# Patient Record
Sex: FEMALE | Race: BLACK OR AFRICAN AMERICAN | NOT HISPANIC OR LATINO | Employment: OTHER | URBAN - METROPOLITAN AREA
[De-identification: names, ages, dates, MRNs, and addresses within clinical notes are randomized per-mention and may not be internally consistent; named-entity substitution may affect disease eponyms.]

---

## 2017-01-18 ENCOUNTER — OFFICE VISIT (OUTPATIENT)
Dept: PAIN MEDICINE | Facility: CLINIC | Age: 77
End: 2017-01-18
Attending: ANESTHESIOLOGY
Payer: MEDICARE

## 2017-01-18 VITALS
BODY MASS INDEX: 33.51 KG/M2 | RESPIRATION RATE: 18 BRPM | HEART RATE: 76 BPM | SYSTOLIC BLOOD PRESSURE: 189 MMHG | TEMPERATURE: 99 F | WEIGHT: 177.5 LBS | DIASTOLIC BLOOD PRESSURE: 75 MMHG | HEIGHT: 61 IN

## 2017-01-18 DIAGNOSIS — M54.16 SPINAL STENOSIS OF LUMBAR REGION WITH RADICULOPATHY: ICD-10-CM

## 2017-01-18 DIAGNOSIS — M48.061 SPINAL STENOSIS OF LUMBAR REGION WITH RADICULOPATHY: ICD-10-CM

## 2017-01-18 DIAGNOSIS — M51.36 DDD (DEGENERATIVE DISC DISEASE), LUMBAR: ICD-10-CM

## 2017-01-18 PROCEDURE — 99214 OFFICE O/P EST MOD 30 MIN: CPT | Mod: S$PBB,,, | Performed by: ANESTHESIOLOGY

## 2017-01-18 PROCEDURE — 99999 PR PBB SHADOW E&M-EST. PATIENT-LVL III: CPT | Mod: PBBFAC,,, | Performed by: ANESTHESIOLOGY

## 2017-01-18 PROCEDURE — 99213 OFFICE O/P EST LOW 20 MIN: CPT | Mod: PBBFAC | Performed by: ANESTHESIOLOGY

## 2017-01-18 RX ORDER — GABAPENTIN 300 MG/1
300 CAPSULE ORAL 3 TIMES DAILY
Qty: 90 CAPSULE | Refills: 5 | Status: SHIPPED | OUTPATIENT
Start: 2017-01-18 | End: 2017-07-17

## 2017-01-18 RX ORDER — MECLIZINE HYDROCHLORIDE 25 MG/1
TABLET ORAL
COMMUNITY
Start: 2016-12-28 | End: 2017-05-31 | Stop reason: SDUPTHER

## 2017-01-18 NOTE — MR AVS SNAPSHOT
[facility]  [address]  [facility] [address]  Phone: [phone]  Fax: [phone]                  Laura Curry   2017 8:00 AM   Office Visit    Description:  Female : 1940   Provider:  Aparna Welch MD   Department:  [facility]           Reason for Visit     Leg Pain           Diagnoses this Visit        Comments    Spinal stenosis of lumbar region with radiculopathy         DDD (degenerative disc disease), lumbar                To Do List           Future Appointments        Provider Department Dept Phone    2/15/2017 8:00 AM Aparna Welch MD [facility] [phone]      Goals (5 Years of Data)     None       These Medications        Disp Refills Start End    gabapentin (NEURONTIN) 300 MG capsule 90 capsule 5 2017    Take 1 capsule (300 mg total) by mouth 3 (three) times daily. - Oral    Pharmacy: [address] #: [phone]         [facility]     [facility] On Call Nurse Care Line -  Assistance  Registered nurses in the [facility] Call Center provide clinical advisement, health education, appointment booking, and other advisory services.  Call for this free service at [phone].             Medications           Message regarding Medications     Verify the changes and/or additions to your medication regime listed below are the same as discussed with your clinician today.  If any of these changes or additions are incorrect, please notify your healthcare provider.        CHANGE how you are taking these medications     Start Taking Instead of    gabapentin (NEURONTIN) 300 MG capsule gabapentin (NEURONTIN) 300 MG capsule    Dosage:  Take 1 capsule (300 mg total) by mouth 3 (three) times daily. Dosage:  Take 1 capsule (300 mg total) by mouth 2 (two) times daily.    Reason for Change:  Reorder            Verify that the below list of  "medications is an accurate representation of the medications you are currently taking.  If none reported, the list may be blank. If incorrect, please contact your healthcare provider. Carry this list with you in case of emergency.           Current Medications     benzonatate (TESSALON) 100 MG capsule     escitalopram oxalate (LEXAPRO) 10 MG tablet     gabapentin (NEURONTIN) 300 MG capsule Take 1 capsule (300 mg total) by mouth 3 (three) times daily.    glipiZIDE (GLUCOTROL) 10 MG tablet     hydrochlorothiazide (HYDRODIURIL) 25 MG tablet     hydrocodone-acetaminophen 5-325mg (NORCO) 5-325 mg per tablet Take 1 tablet by mouth every 6 (six) hours as needed for Pain.    lisinopril (PRINIVIL,ZESTRIL) 20 MG tablet     meclizine (ANTIVERT) 25 mg tablet     metformin (GLUCOPHAGE) 500 MG tablet Take 500 mg by mouth 2 (two) times daily with meals.    metoprolol succinate (TOPROL-XL) 25 MG 24 hr tablet Take 25 mg by mouth once daily.    timolol maleate 0.5% (TIMOPTIC) 0.5 % Drop INT 1 DROP  INTO EACH EYE BID    triamcinolone acetonide 0.1% (KENALOG) 0.1 % ointment            Clinical Reference Information           Vital Signs - Last Recorded  Most recent update: 1/18/2017  8:00 AM by Cinthia Oglesby MA    BP Pulse Temp Resp Ht Wt    (!) 189/75 76 98.5 °F (36.9 °C) (Oral) 18 5' 1" (1.549 m) 80.5 kg (177 lb 7.5 oz)    BMI                33.53 kg/m2          Blood Pressure          Most Recent Value    BP  (!)  189/75      Allergies as of 1/18/2017     Iodine      Immunizations Administered on Date of Encounter - 1/18/2017     None      [facility] Sign-Up     Activating your [facility] account is as easy as 1-2-3!     1) Visit [email or web address], select Sign Up Now, enter this activation code and your date of birth, then select Next.  [email or web address]  Expires: 1/28/2017  8:23 AM      2) Create a username and password to use when you visit [facility] in the future and select a security question in case you lose your password and " select Next.    3) Enter your e-mail address and click Sign Up!    Additional Information  If you have questions, please e-mail [email or web address] or call [phone] to talk to our [facility] staff. Remember, [facility] is NOT to be used for urgent needs. For medical emergencies, dial 911.         Instructions    Increase gabapentin to 1 capsule THREE TIMES DAILY.           Smoking Cessation     If you would like to quit smoking:   You may be eligible for free services if you are a [place] resident and started smoking cigarettes before September 1, 1988.  Call the Smoking Cessation Trust (SCT) toll free at (381) 975-2057 or (080) 595-1268.   Call 2-908-QUIT-NOW if you do not meet the above criteria.

## 2017-01-18 NOTE — PROGRESS NOTES
Subjective:     Patient ID: Laura Curry is a 76 y.o. female.    Chief Complaint: Leg Pain    Consulted by: No ref. provider found     Disclaimer: This note was generated using voice recognition software.  There may be a typographical errors that were missed during proofreading.      HPI:    Laura Curry is a 76 y.o. female who presents today with right thigh pain that began 4 months ago.  She has a history of a right TKA.  She also has a history of a questionable right hip fracture in 2012.  The pain is worse with walking and standing. Pain is constant.   This pain is described in detail below.    Interval History (11/11/2016):  She returns today for follow up.  She reports that the hip injection provided ~40% relief for 3 days.  Her pain is severe today.  No bladder/bowel incontinence, no saddle anesthesia.      Interval History (12/24/2016):  She returns today for follow up.  She reports that the Right L3 TFESI provided ~ 40% relief.  She did not increase the tramadol as she was still using her old bottle, which said every 12 hours.  She is tolerating the gabapentin 300 mg QHS.    Interval History (1/18/2017):  She returns today for follow up.  She reports that the tramadol is somewhat helpful, but it makes her sleepy.  The gabapentin is also helpful.  She saw a neurosurgeon, who felt she wasn't a surgical candidate.  Her PCP has given her a prescription for Norco that she has not filled yet.    Physical Therapy: For her knee.  She was doing her HEP    Non-pharmacologic Treatment: Heating pad does not help much         · TENS? Not tried    Pain Medications:         · Currently taking: Tramadol QID, gabapentin 300 mg BID    · Has tried in the past:  Tylenol, steroid dose pack (helped very little), gabapentin (took once daily for 2 months)    · Has not tried: She was told not to take Advil or Aleve. Lyrica, SNRIs, TCAs, muscle relaxants, topical cream    Blood thinners: None    Interventional Therapies:    · Right hip IA injection: <40% relief  · Right L3 and attempted L4 TFESI: 40% relief    Relevant Surgeries: Right TKA    Affecting sleep? Yes    Affecting daily activities? Yes    Depressive symptoms? No          · SI/HI? No    Work status: Retired, worked in [employer] at [employer] for 25 years    Pain Scales  Best: 7/10  Worst: 9/10  Usually: 0/10  Today: 9/10    Leg Pain    Pain location: right thigh. This is a new problem. Episode onset: 4 months ago. The problem occurs constantly. The problem has been gradually worsening. The quality of the pain is described as aching. Associated symptoms include stiffness. Pertinent negatives include no itching. The symptoms are aggravated by sitting, standing and walking. She has tried oral narcotics, other, injection treatment, NSAIDs and rest for the symptoms. Physical therapy was not tried.      Last 3 PDI Scores 1/18/2017 12/14/2016 11/11/2016   Pain Disability Index (PDI) 58 49 62       Review of Systems   Constitution: Negative. Negative for weight gain and weight loss.   HENT: Negative.  Negative for headaches.    Eyes: Negative.  Negative for double vision.   Cardiovascular: Negative.    Respiratory: Positive for cough and wheezing. Negative for shortness of breath.    Endocrine: Negative.    Hematologic/Lymphatic: Negative for bleeding problem. Does not bruise/bleed easily.   Skin: Negative.  Negative for itching.   Musculoskeletal: Positive for back pain, joint pain, muscle cramps and stiffness.   Gastrointestinal: Negative.  Negative for abdominal pain.   Genitourinary: Negative.    Neurological: Positive for loss of balance. Negative for difficulty with concentration, dizziness and seizures.   Psychiatric/Behavioral: Positive for depression. Negative for altered mental status and suicidal ideas. The patient has insomnia. The patient is not nervous/anxious.    Allergic/Immunologic: Negative for HIV exposure.             Past Medical History   Diagnosis Date    Cataract  "     extraction    Depression     Diabetes mellitus type I     Glaucoma     Hypertension        Past Surgical History   Procedure Laterality Date    Hysterectomy      Joint replacement         Review of patient's allergies indicates:   Allergen Reactions    Iodine      Shortness of breath^       Current Outpatient Prescriptions   Medication Sig Dispense Refill    benzonatate (TESSALON) 100 MG capsule       escitalopram oxalate (LEXAPRO) 10 MG tablet       gabapentin (NEURONTIN) 300 MG capsule Take 1 capsule (300 mg total) by mouth 2 (two) times daily. 60 capsule 5    glipiZIDE (GLUCOTROL) 10 MG tablet       hydrochlorothiazide (HYDRODIURIL) 25 MG tablet       hydrocodone-acetaminophen 5-325mg (NORCO) 5-325 mg per tablet Take 1 tablet by mouth every 6 (six) hours as needed for Pain.      lisinopril (PRINIVIL,ZESTRIL) 20 MG tablet       metformin (GLUCOPHAGE) 500 MG tablet Take 500 mg by mouth 2 (two) times daily with meals.      metoprolol succinate (TOPROL-XL) 25 MG 24 hr tablet Take 25 mg by mouth once daily.      timolol maleate 0.5% (TIMOPTIC) 0.5 % Drop INT 1 DROP  INTO EACH EYE BID  4    triamcinolone acetonide 0.1% (KENALOG) 0.1 % ointment        No current facility-administered medications for this visit.        No family history on file.    Social History     Social History    Marital status: Single     Spouse name: N/A    Number of children: N/A    Years of education: N/A     Occupational History    Not on file.     Social History Main Topics    Smoking status: Current Every Day Smoker    Smokeless tobacco: Not on file    Alcohol use No    Drug use: No    Sexual activity: No     Other Topics Concern    Not on file     Social History Narrative       Objective:     Vitals:    01/18/17 0753   BP: (!) 189/75   Pulse: 76   Resp: 18   Temp: 98.5 °F (36.9 °C)   TempSrc: Oral   Weight: 80.5 kg (177 lb 7.5 oz)   Height: 5' 1" (1.549 m)   PainSc:   9   PainLoc: Leg       GEN:  Well " developed, well nourished.  Uncomfortable in exam room.   HEENT:  No trauma.  Mucous membranes moist.  Nares patent bilaterally.  PSYCH: Normal affect. Thought content appropriate.  CHEST:  Breathing symmetric.  No audible wheezing.  ABD: Soft, non-distended.  SKIN:  Warm, pink, dry.  No rash on exposed areas.    EXT:  No cyanosis, clubbing, or edema.  No color change or changes in nail or hair growth.  NEURO/MUSCULOSKELETAL:  Fully alert, oriented, and appropriate. Speech normal raghu. No cranial nerve deficits.   Gait: Antalgic, ambulating with a rolling walker.      Previous Physical exam  Present trendelenburg sign bilaterally.   Motor Strength: 5/5 motor strength throughout lower extremities.   Sensory: No sensory deficit in the lower extremities.   Reflexes:  2+ and symmetric throughout.  Downgoing Babinski's bilaterally.  No clonus or spasticity.  L-Spine:  Full ROM with pain on Extension. Negative facet loading bilaterally.  Negative SLR bilaterally.  Positive femoral stretch on right.  SI Joint/Hip: Negative NYA and FADIR on right  Minimal TTP over right hip.  No TTP over lumbar paraspinals, bilateral SI joints, left hip, piriformis muscles, or GTB.        Imaging:      MRI of her HPI brought at initial visit.  Brief review of the images at that visit revealed no gross abnormality and no fracture.    MRI Lumbar Spine:    Findings:    There is grade 1 anterolisthesis of L3 on L4 grade 1 anterolisthesis of L4 on L5.  There is marrow edema along the appearance of L3 and superior endplate of L4.  There is minimal edema in the superior endplate of L5.  The conus terminates at the level of L1/L2 disc space.    Again is noted degenerative disease lower thoracic spine.  Disc space narrowing and broad-based disc bulge at T11/T12 is not changed.    T12/L1: There is moderate disc space narrowing with a small broad-based disc bulge.    L1/2: There is moderate disc space narrowing.  There is no disc bulge.  There is  moderate bilateral facet hypertrophy.  There is no significant canal or foraminal narrowing.    L2/3: There is moderate disc space narrowing.  There is a broad-based disc bulge.  There is facet hypertrophy.  There is mild narrowing of the bilateral neural foramen.    L3/4: There is severe disc space narrowing.  There is severe facet arthropathy.  There is a severe broad-based disc bulge with superimposed disc extrusion with minimal superior migration.  There is severe narrowing of the central canal to a residual diameter of 3 m in greatest AP dimension.  There is severe bilateral neural foraminal narrowing and narrowing of the bilateral lateral recess.  This compression of cauda equina with redundancy of the nerve roots.  This level.    L4/5: There is severe disc space narrowing.  There is severe facet arthropathy.  There is a large broad-based disc bulge greater on the right.  There is severe narrowing of the right neural foramen.  There is moderate to severe narrowing of the left neural foramen.  There is severe narrowing of the central canal to residual 4 mm in greatest diameter.    L5/S1: There is disc space narrowing with a broad-based disc bulge.  There is moderate narrowing of bilateral neural foramen.  There is moderate narrowing of the central canal to residual 7 mm in AP diameter.    Impression:  1. Broad-based disc bulge at L3/4 with superimposed disc extrusion resulting in severe narrowing of the central canal and bilateral neural foramen as well as severe narrowing of bilateral lateral process.  There is impingement of the descending cauda equina.  This slightly worse compared to the prior exam.  2. Broad-based disc bulge L4/5 with severe narrowing of the central canal and severe right and moderate severe left neural foraminal narrowing.  This is also slightly worsened from prior exam.      Assessment:     Encounter Diagnoses   Name Primary?    Spinal stenosis of lumbar region with radiculopathy      DDD (degenerative disc disease), lumbar        Plan:     Lauar was seen today for leg pain.    Diagnoses and all orders for this visit:    Spinal stenosis of lumbar region with radiculopathy  -     gabapentin (NEURONTIN) 300 MG capsule; Take 1 capsule (300 mg total) by mouth 3 (three) times daily.    DDD (degenerative disc disease), lumbar  -     gabapentin (NEURONTIN) 300 MG capsule; Take 1 capsule (300 mg total) by mouth 3 (three) times daily.       Her pain is consistent with the above.    We discussed the assessment and recommendations.  All available images were reviewed. We discussed the disease process, prognosis, treatment plan, and risks and benefits. The patient is aware of the risks and benefits of the medications being prescribed, common side effects, and proper usage. The following is the plan we agreed on:     1. Agree with trial of Norco  2. Increase gabapentin to 300 mg TID.    3. Can repeat right L3 TFESI if needed in the future, but I am concerned re: her BP spike after the last one.  4. Discussed SCS.  Info given today  5. Discussed FRP  6. She is not a surgical candidate  7. RTC in 3-6 weeks or sooner if needed.    Greater than 25 minutes spent in total in todays visit with the patient, with more than half that time direct face to face counseling and education with the patient today. We discussed the disease process, prognosis, treatment plan, and risks and benefits.    The above plan and management options were discussed at length with patient. Patient is in agreement with the above and verbalized understanding. It will be communicated with the referring physician via electronic record, fax, or mail.    Ortho/SPM Exam

## 2017-03-03 ENCOUNTER — OFFICE VISIT (OUTPATIENT)
Dept: PAIN MEDICINE | Facility: CLINIC | Age: 77
End: 2017-03-03
Attending: ANESTHESIOLOGY
Payer: MEDICARE

## 2017-03-03 VITALS
DIASTOLIC BLOOD PRESSURE: 60 MMHG | BODY MASS INDEX: 33.79 KG/M2 | HEIGHT: 61 IN | TEMPERATURE: 98 F | HEART RATE: 63 BPM | WEIGHT: 179 LBS | SYSTOLIC BLOOD PRESSURE: 143 MMHG | RESPIRATION RATE: 20 BRPM

## 2017-03-03 DIAGNOSIS — R53.81 DEBILITY: ICD-10-CM

## 2017-03-03 DIAGNOSIS — M51.36 DDD (DEGENERATIVE DISC DISEASE), LUMBAR: ICD-10-CM

## 2017-03-03 DIAGNOSIS — M54.16 SPINAL STENOSIS OF LUMBAR REGION WITH RADICULOPATHY: Primary | ICD-10-CM

## 2017-03-03 DIAGNOSIS — M54.16 RIGHT LUMBAR RADICULITIS: ICD-10-CM

## 2017-03-03 DIAGNOSIS — M25.551 RIGHT HIP PAIN: ICD-10-CM

## 2017-03-03 DIAGNOSIS — M48.061 SPINAL STENOSIS OF LUMBAR REGION WITH RADICULOPATHY: Primary | ICD-10-CM

## 2017-03-03 PROCEDURE — 99999 PR PBB SHADOW E&M-EST. PATIENT-LVL III: CPT | Mod: PBBFAC,,, | Performed by: ANESTHESIOLOGY

## 2017-03-03 PROCEDURE — 99214 OFFICE O/P EST MOD 30 MIN: CPT | Mod: S$PBB,GC,, | Performed by: ANESTHESIOLOGY

## 2017-03-03 PROCEDURE — 99213 OFFICE O/P EST LOW 20 MIN: CPT | Mod: PBBFAC | Performed by: ANESTHESIOLOGY

## 2017-03-03 RX ORDER — FUROSEMIDE 20 MG/1
TABLET ORAL
COMMUNITY
Start: 2017-02-01 | End: 2017-05-31

## 2017-03-03 NOTE — MR AVS SNAPSHOT
[facility]  [address]  [place] [address]  Phone: [phone]  Fax: [phone]                  Laura Curry   3/3/2017 7:15 AM   Office Visit    Description:  Female : 1940   Provider:  Aparna Welch MD   Department:  [facility]           Reason for Visit     Low-back Pain           Diagnoses this Visit        Comments    Spinal stenosis of lumbar region with radiculopathy    -  Primary     DDD (degenerative disc disease), lumbar         Right lumbar radiculitis         Right hip pain         Debility                To Do List           Future Appointments        Provider Department Dept Phone    2017 7:00 AM Aparna Welch MD [facility] [phone]      Goals (5 Years of Data)     None      [facility]     [facility] Care [facility] -  Assistance  Registered nurses in the [facility] provide clinical advisement, health education, appointment booking, and other advisory services.  Call for this free service at [phone].             Medications           Message regarding Medications     Verify the changes and/or additions to your medication regime listed below are the same as discussed with your clinician today.  If any of these changes or additions are incorrect, please notify your healthcare provider.             Verify that the below list of medications is an accurate representation of the medications you are currently taking.  If none reported, the list may be blank. If incorrect, please contact your healthcare provider. Carry this list with you in case of emergency.           Current Medications     benzonatate (TESSALON) 100 MG capsule     escitalopram oxalate (LEXAPRO) 10 MG tablet     furosemide (LASIX) 20 MG tablet     gabapentin (NEURONTIN) 300 MG capsule Take 1 capsule (300 mg total) by mouth 3 (three) times daily.    glipiZIDE (GLUCOTROL) 10 MG tablet     hydrochlorothiazide  "(HYDRODIURIL) 25 MG tablet     hydrocodone-acetaminophen 5-325mg (NORCO) 5-325 mg per tablet Take 1 tablet by mouth every 6 (six) hours as needed for Pain.    lisinopril (PRINIVIL,ZESTRIL) 20 MG tablet     meclizine (ANTIVERT) 25 mg tablet     metformin (GLUCOPHAGE) 500 MG tablet Take 500 mg by mouth 2 (two) times daily with meals.    metoprolol succinate (TOPROL-XL) 25 MG 24 hr tablet Take 25 mg by mouth once daily.    timolol maleate 0.5% (TIMOPTIC) 0.5 % Drop INT 1 DROP  INTO EACH EYE BID    triamcinolone acetonide 0.1% (KENALOG) 0.1 % ointment            Clinical Reference Information           Your Vitals Were     BP Pulse Temp Resp Height Weight    143/60 63 97.9 °F (36.6 °C) (Oral) 20 5' 1" (1.549 m) 81.2 kg (179 lb 0.2 oz)    BMI                33.82 kg/m2          Blood Pressure          Most Recent Value    BP  (!)  143/60      Allergies as of 3/3/2017     Iodine      Immunizations Administered on Date of Encounter - 3/3/2017     None      Orders Placed During Today's Visit      Normal Orders This Visit    Ambulatory consult to [facility]       [facility] Sign-Up     Activating your [facility] account is as easy as 1-2-3!     1) Visit [email or web address], select Sign Up Now, enter this activation code and your date of birth, then select Next.  [email or web address]  Expires: 4/17/2017  8:16 AM      2) Create a username and password to use when you visit [facility] in the future and select a security question in case you lose your password and select Next.    3) Enter your e-mail address and click Sign Up!    Additional Information  If you have questions, please e-mail [email or web address] or call [phone] to talk to our [facility] staff. Remember, [facility] is NOT to be used for urgent needs. For medical emergencies, dial 911.         Smoking Cessation     If you would like to quit smoking:   You may be eligible for free services if you are a [place] resident and started smoking cigarettes before " September 1, 1988.  Call the Smoking Cessation Trust (SCT) toll free at (869) 612-5933 or (341) 814-0462.   Call 1-800-QUIT-NOW if you do not meet the above criteria.            Language Assistance Services     ATTENTION: Language assistance services are available, free of charge. Please call [phone].      ATENCIÓN: Si habla español, tiene a rodriguez disposición servicios gratuitos de asistencia lingüística. Llame al [phone].     CHÚ Ý: N?u b?n nói Ti?ng Vi?t, có các d?ch v? h? tr? ngôn ng? mi?n phí dành cho b?n. G?i s? [phone].         [facility] complies with applicable Federal civil rights laws and does not discriminate on the basis of race, color, national origin, age, disability, or sex.

## 2017-03-03 NOTE — PROGRESS NOTES
Subjective:     Patient ID: Laura Curry is a 76 y.o. female.    Chief Complaint: No chief complaint on file.    Consulted by: No ref. provider found     Disclaimer: This note was generated using voice recognition software.  There may be a typographical errors that were missed during proofreading.      HPI:    Laura Curry is a 76 y.o. female who presents today with right thigh pain that began 4 months ago.  She has a history of a right TKA.  She also has a history of a questionable right hip fracture in 2012.  The pain is worse with walking and standing. Pain is constant.   This pain is described in detail below.    Interval History (11/11/2016):  She returns today for follow up.  She reports that the hip injection provided ~40% relief for 3 days.  Her pain is severe today.  No bladder/bowel incontinence, no saddle anesthesia.      Interval History (12/24/2016):  She returns today for follow up.  She reports that the Right L3 TFESI provided ~ 40% relief.  She did not increase the tramadol as she was still using her old bottle, which said every 12 hours.  She is tolerating the gabapentin 300 mg QHS.    Interval History (1/18/2017):  She returns today for follow up.  She reports that the tramadol is somewhat helpful, but it makes her sleepy.  The gabapentin is also helpful.  She saw a neurosurgeon, who felt she wasn't a surgical candidate.  Her PCP has given her a prescription for Norco that she has not filled yet.    Interval History (03/03/2017):  Ms Curry  returns today for follow up.  She reports that she has  been taking her Gabapentin once or twice a day as it makes her sleepy. She has seen a neurosurgeon who has told her she is not a candidate for surgery. Her PCP has written her a prescription of Narco which she has not filled. Her pain remains a 9/10. She reports a hx of falls and in currently using a walker.     Physical Therapy: For her knee.  She was doing her HEP    Non-pharmacologic Treatment:  Heating pad does not help much         · TENS? Not tried    Pain Medications:         · Currently taking: Tramadol QID, gabapentin 300 mg BID    · Has tried in the past:  Tylenol, steroid dose pack (helped very little), gabapentin (took once daily for 2 months)    · Has not tried: She was told not to take Advil or Aleve. Lyrica, SNRIs, TCAs, muscle relaxants, topical cream    Blood thinners: None    Interventional Therapies:   · Right hip IA injection: <40% relief  · Right L3 and attempted L4 TFESI: 40% relief    Relevant Surgeries: Right TKA    Affecting sleep? Yes    Affecting daily activities? Yes    Depressive symptoms? No          · SI/HI? No    Work status: Retired, worked in [employer] for 25 years    Pain Scales  Best: 7/10  Worst: 9/10  Usually: 0/10  Today: 9/10    Leg Pain    Pain location: right thigh. This is a new problem. Episode onset: 4 months ago. The problem occurs constantly. The problem has been gradually worsening. The quality of the pain is described as aching. Associated symptoms include stiffness. Pertinent negatives include no fever or itching. The symptoms are aggravated by sitting, standing and walking. She has tried oral narcotics, other, injection treatment, NSAIDs and rest for the symptoms. Physical therapy was not tried.      Last 3 PDI Scores 3/3/2017 1/18/2017 12/14/2016   Pain Disability Index (PDI) 57 [phone]       Review of Systems   Constitution: Negative. Negative for chills, fever, malaise/fatigue, weight gain and weight loss.   HENT: Negative.  Negative for ear pain, headaches and hoarse voice.    Eyes: Negative.  Negative for blurred vision, double vision, pain and visual disturbance.   Cardiovascular: Negative.  Negative for chest pain, dyspnea on exertion and irregular heartbeat.   Respiratory: Positive for cough and wheezing. Negative for shortness of breath.    Endocrine: Negative.  Negative for cold intolerance and heat intolerance.   Hematologic/Lymphatic: Negative  for adenopathy and bleeding problem. Does not bruise/bleed easily.   Skin: Negative.  Negative for color change, itching and rash.   Musculoskeletal: Positive for back pain, joint pain, muscle cramps and stiffness.   Gastrointestinal: Negative.  Negative for abdominal pain, change in bowel habit, diarrhea, hematemesis, hematochezia, melena and vomiting.   Genitourinary: Negative.  Negative for flank pain, frequency, hematuria and urgency.   Neurological: Positive for loss of balance. Negative for difficulty with concentration, dizziness and seizures.   Psychiatric/Behavioral: Positive for depression. Negative for altered mental status and suicidal ideas. The patient has insomnia. The patient is not nervous/anxious.    Allergic/Immunologic: Negative for HIV exposure.             Past Medical History:   Diagnosis Date    Cataract     extraction    Depression     Diabetes mellitus type I     Glaucoma     Hypertension        Past Surgical History:   Procedure Laterality Date    HYSTERECTOMY      JOINT REPLACEMENT         Review of patient's allergies indicates:   Allergen Reactions    Iodine      Shortness of breath^       Current Outpatient Prescriptions   Medication Sig Dispense Refill    benzonatate (TESSALON) 100 MG capsule       escitalopram oxalate (LEXAPRO) 10 MG tablet       gabapentin (NEURONTIN) 300 MG capsule Take 1 capsule (300 mg total) by mouth 3 (three) times daily. 90 capsule 5    glipiZIDE (GLUCOTROL) 10 MG tablet       hydrochlorothiazide (HYDRODIURIL) 25 MG tablet       hydrocodone-acetaminophen 5-325mg (NORCO) 5-325 mg per tablet Take 1 tablet by mouth every 6 (six) hours as needed for Pain.      lisinopril (PRINIVIL,ZESTRIL) 20 MG tablet       meclizine (ANTIVERT) 25 mg tablet       metformin (GLUCOPHAGE) 500 MG tablet Take 500 mg by mouth 2 (two) times daily with meals.      metoprolol succinate (TOPROL-XL) 25 MG 24 hr tablet Take 25 mg by mouth once daily.      timolol maleate  "0.5% (TIMOPTIC) 0.5 % Drop INT 1 DROP  INTO EACH EYE BID  4    triamcinolone acetonide 0.1% (KENALOG) 0.1 % ointment        No current facility-administered medications for this visit.        No family history on file.    Social History     Social History    Marital status: Single     Spouse name: N/A    Number of children: N/A    Years of education: N/A     Occupational History    Not on file.     Social History Main Topics    Smoking status: Current Every Day Smoker    Smokeless tobacco: Not on file    Alcohol use No    Drug use: No    Sexual activity: No     Other Topics Concern    Not on file     Social History Narrative       Objective:     Vitals:    03/03/17 0706   BP: (!) 143/60   Pulse: 63   Resp: 20   Temp: 97.9 °F (36.6 °C)   TempSrc: Oral   Weight: 81.2 kg (179 lb 0.2 oz)   Height: 5' 1" (1.549 m)   PainSc:   9   PainLoc: Back       GEN:  Well developed, well nourished.  Uncomfortable in exam room.   HEENT:  No trauma.  Mucous membranes moist.  Nares patent bilaterally.  PSYCH: Normal affect. Thought content appropriate.  CHEST:  Breathing symmetric.  No audible wheezing.  ABD: Soft, non-distended.  SKIN:  Warm, pink, dry.  No rash on exposed areas.    EXT:  No cyanosis, clubbing, or edema.  No color change or changes in nail or hair growth.  NEURO/MUSCULOSKELETAL:  Fully alert, oriented, and appropriate. Speech normal raghu. No cranial nerve deficits.   Gait: Antalgic, ambulating with a rolling walker.      Physical exam  Present trendelenburg sign bilaterally.   Motor Strength: 5/5 motor strength throughout lower extremities.   Sensory: No sensory deficit in the lower extremities.   Reflexes:  2+ and symmetric throughout.  Downgoing Babinski's bilaterally.  No clonus or spasticity.  L-Spine:  Full ROM with pain on Extension. Negative facet loading bilaterally.  Negative SLR bilaterally.  Positive femoral stretch on right.  SI Joint/Hip: Negative NYA and FADIR on right  Minimal TTP over " right hip.  No TTP over lumbar paraspinals, bilateral SI joints, left hip, piriformis muscles, or GTB.        Imaging:      MRI of her HPI brought at initial visit.  Brief review of the images at that visit revealed no gross abnormality and no fracture.    MRI Lumbar Spine:    Findings:    There is grade 1 anterolisthesis of L3 on L4 grade 1 anterolisthesis of L4 on L5.  There is marrow edema along the appearance of L3 and superior endplate of L4.  There is minimal edema in the superior endplate of L5.  The conus terminates at the level of L1/L2 disc space.    Again is noted degenerative disease lower thoracic spine.  Disc space narrowing and broad-based disc bulge at T11/T12 is not changed.    T12/L1: There is moderate disc space narrowing with a small broad-based disc bulge.    L1/2: There is moderate disc space narrowing.  There is no disc bulge.  There is moderate bilateral facet hypertrophy.  There is no significant canal or foraminal narrowing.    L2/3: There is moderate disc space narrowing.  There is a broad-based disc bulge.  There is facet hypertrophy.  There is mild narrowing of the bilateral neural foramen.    L3/4: There is severe disc space narrowing.  There is severe facet arthropathy.  There is a severe broad-based disc bulge with superimposed disc extrusion with minimal superior migration.  There is severe narrowing of the central canal to a residual diameter of 3 m in greatest AP dimension.  There is severe bilateral neural foraminal narrowing and narrowing of the bilateral lateral recess.  This compression of cauda equina with redundancy of the nerve roots.  This level.    L4/5: There is severe disc space narrowing.  There is severe facet arthropathy.  There is a large broad-based disc bulge greater on the right.  There is severe narrowing of the right neural foramen.  There is moderate to severe narrowing of the left neural foramen.  There is severe narrowing of the central canal to residual 4 mm  in greatest diameter.    L5/S1: There is disc space narrowing with a broad-based disc bulge.  There is moderate narrowing of bilateral neural foramen.  There is moderate narrowing of the central canal to residual 7 mm in AP diameter.    Impression:  1. Broad-based disc bulge at L3/4 with superimposed disc extrusion resulting in severe narrowing of the central canal and bilateral neural foramen as well as severe narrowing of bilateral lateral process.  There is impingement of the descending cauda equina.  This slightly worse compared to the prior exam.  2. Broad-based disc bulge L4/5 with severe narrowing of the central canal and severe right and moderate severe left neural foraminal narrowing.  This is also slightly worsened from prior exam.      Assessment:     Encounter Diagnoses   Name Primary?    Spinal stenosis of lumbar region with radiculopathy Yes    DDD (degenerative disc disease), lumbar     Right lumbar radiculitis     Right hip pain     Debility        Plan:     Laura was seen today for low-back pain.    Diagnoses and all orders for this visit:    Spinal stenosis of lumbar region with radiculopathy  -     Ambulatory consult to [facility]    DDD (degenerative disc disease), lumbar  -     [facility] [place] [facility]    Right lumbar radiculitis  -     Ambulatory consult to [facility]    Right hip pain  -     Ambulatory [facility]    Debility  -     Ambulatory consult to [facility]       Her pain is consistent with the above.    We discussed the assessment and recommendations.  All available images were reviewed. We discussed the disease process, prognosis, treatment plan, and risks and benefits. The patient is aware of the risks and benefits of the medications being prescribed, common side effects, and proper usage. The following is the plan we agreed on:     1. Advised to try trial of Norco  2. Increase gabapentin to 300 mg TID.      3. Refer to [facility] if she is able to complete this then she may be fit enough for FRP which I believe she would benefit from.   4. Discussed SCS pt is not interisted at this time  5. RTC in 6 weeks or sooner if needed.    Greater than 25 minutes spent in total in todays visit with the patient, with more than half that time direct face to face counseling and education with the patient today. We discussed the disease process, prognosis, treatment plan, and risks and benefits.    I have seen the patient with the resident physician.  I have performed my own history and physical exam and we have come up with the above plan.  The patient is in agreement with our plan.    The above plan and management options were discussed at length with patient. Patient is in agreement with the above and verbalized understanding. It will be communicated with the referring physician via electronic record, fax, or mail.    Ortho/SPM Exam

## 2017-04-19 ENCOUNTER — OFFICE VISIT (OUTPATIENT)
Dept: PAIN MEDICINE | Facility: CLINIC | Age: 77
End: 2017-04-19
Attending: ANESTHESIOLOGY
Payer: MEDICARE

## 2017-04-19 VITALS
DIASTOLIC BLOOD PRESSURE: 58 MMHG | TEMPERATURE: 98 F | WEIGHT: 178.56 LBS | BODY MASS INDEX: 33.71 KG/M2 | RESPIRATION RATE: 20 BRPM | HEART RATE: 64 BPM | HEIGHT: 61 IN | SYSTOLIC BLOOD PRESSURE: 166 MMHG

## 2017-04-19 DIAGNOSIS — M54.16 SPINAL STENOSIS OF LUMBAR REGION WITH RADICULOPATHY: Primary | ICD-10-CM

## 2017-04-19 DIAGNOSIS — M25.551 RIGHT HIP PAIN: ICD-10-CM

## 2017-04-19 DIAGNOSIS — R53.81 DEBILITY: ICD-10-CM

## 2017-04-19 DIAGNOSIS — M51.36 DDD (DEGENERATIVE DISC DISEASE), LUMBAR: ICD-10-CM

## 2017-04-19 DIAGNOSIS — M54.16 RIGHT LUMBAR RADICULITIS: ICD-10-CM

## 2017-04-19 DIAGNOSIS — M48.061 SPINAL STENOSIS OF LUMBAR REGION WITH RADICULOPATHY: Primary | ICD-10-CM

## 2017-04-19 DIAGNOSIS — Z96.651 H/O TOTAL KNEE REPLACEMENT, RIGHT: ICD-10-CM

## 2017-04-19 DIAGNOSIS — R60.0 BILATERAL LOWER EXTREMITY EDEMA: ICD-10-CM

## 2017-04-19 PROCEDURE — 99999 PR PBB SHADOW E&M-EST. PATIENT-LVL III: CPT | Mod: PBBFAC,,, | Performed by: ANESTHESIOLOGY

## 2017-04-19 PROCEDURE — 99214 OFFICE O/P EST MOD 30 MIN: CPT | Mod: S$PBB,,, | Performed by: ANESTHESIOLOGY

## 2017-04-19 PROCEDURE — 99213 OFFICE O/P EST LOW 20 MIN: CPT | Mod: PBBFAC | Performed by: ANESTHESIOLOGY

## 2017-04-19 RX ORDER — HYDROXYZINE HYDROCHLORIDE 25 MG/1
25 TABLET, FILM COATED ORAL 3 TIMES DAILY PRN
COMMUNITY
Start: 2017-04-13

## 2017-04-19 NOTE — PROGRESS NOTES
Subjective:     Patient ID: Laura Curry is a 76 y.o. female.    Chief Complaint: Knee Pain (6 week follow up ) and Low-back Pain    Consulted by: No ref. provider found     Disclaimer: This note was generated using voice recognition software.  There may be a typographical errors that were missed during proofreading.      HPI:    Laura Curry is a 76 y.o. female who presents today with right thigh pain that began 4 months ago.  She has a history of a right TKA.  She also has a history of a questionable right hip fracture in 2012.  The pain is worse with walking and standing. Pain is constant.   This pain is described in detail below.    Interval History (11/11/2016):  She returns today for follow up.  She reports that the hip injection provided ~40% relief for 3 days.  Her pain is severe today.  No bladder/bowel incontinence, no saddle anesthesia.      Interval History (12/24/2016):  She returns today for follow up.  She reports that the Right L3 TFESI provided ~ 40% relief.  She did not increase the tramadol as she was still using her old bottle, which said every 12 hours.  She is tolerating the gabapentin 300 mg QHS.    Interval History (1/18/2017):  She returns today for follow up.  She reports that the tramadol is somewhat helpful, but it makes her sleepy.  The gabapentin is also helpful.  She saw a neurosurgeon, who felt she wasn't a surgical candidate.  Her PCP has given her a prescription for Norco that she has not filled yet.    Interval History (03/03/2017):  Ms Curry  returns today for follow up.  She reports that she has  been taking her Gabapentin once or twice a day as it makes her sleepy. She has seen a neurosurgeon who has told her she is not a candidate for surgery. Her PCP has written her a prescription of Narco which she has not filled. Her pain remains a 9/10. She reports a hx of falls and in currently using a walker.     Interval History (4/19/2017):  She returns today for follow up.  She  reports that her pain is worsening.  She has tried the Norco, but it makes her sleepy.  The gabapentin also makes her sleepy.  She reports 3 falls since her last visit.    She has been helpful for the pain.    Physical Therapy: For her knee.  She was doing her HEP    Non-pharmacologic Treatment: Heating pad does not help much         · TENS? Not tried    Pain Medications:         · Currently taking: Norco infrequently, gabapentin 300 mg BID    · Has tried in the past:  Tylenol, steroid dose pack (helped very little), gabapentin (took once daily for 2 months)    · Has not tried: She was told not to take Advil or Aleve. Lyrica, SNRIs, TCAs, muscle relaxants, topical cream    Blood thinners: None    Interventional Therapies:   · Right hip IA injection: <40% relief  · Right L3 and attempted L4 TFESI: 40% relief, but her BP increased to dangerous levels    Relevant Surgeries: Right TKA    Affecting sleep? Yes    Affecting daily activities? Yes    Depressive symptoms? No          · SI/HI? No    Work status: Retired, worked in [place] at [employer] for 25 years    Pain Scales  Best: 7/10  Worst: 9/10  Usually: 0/10  Today: 9/10    Knee Pain    The pain is present in the right ankle. This is a chronic problem. The injury was the result of a falling action The problem occurs constantly. The problem has been unchanged. The quality of the pain is described as aching and sharp. The pain is at a severity of 8/10. Associated symptoms include stiffness. Pertinent negatives include no fever or itching. The symptoms are aggravated by activity, walking and standing. She has tried oral narcotics for the symptoms. Physical therapy was not tried.  Low-back Pain   Associated symptoms include leg pain. Pertinent negatives include no abdominal pain, chest pain, fever, headaches or weight loss.   Leg Pain    Pain location: right thigh. This is a new problem. Episode onset: 4 months ago. The problem occurs constantly. The problem has been  gradually worsening. The quality of the pain is described as aching. Associated symptoms include stiffness. Pertinent negatives include no fever or itching. The symptoms are aggravated by sitting, standing and walking. She has tried oral narcotics, other, injection treatment, NSAIDs and rest for the symptoms. Physical therapy was not tried.      Last 3 PDI Scores 4/19/2017 3/3/2017 1/18/2017   Pain Disability Index (PDI) [phone]       Review of Systems   Constitution: Negative. Negative for chills, fever, malaise/fatigue, weight gain and weight loss.   HENT: Negative.  Negative for ear pain, headaches and hoarse voice.    Eyes: Negative.  Negative for blurred vision, double vision, pain and visual disturbance.   Cardiovascular: Negative.  Negative for chest pain, dyspnea on exertion and irregular heartbeat.   Respiratory: Positive for cough and wheezing. Negative for shortness of breath.    Endocrine: Negative.  Negative for cold intolerance and heat intolerance.   Hematologic/Lymphatic: Negative for adenopathy and bleeding problem. Does not bruise/bleed easily.   Skin: Negative.  Negative for color change, itching and rash.   Musculoskeletal: Positive for back pain, joint pain, muscle cramps and stiffness.   Gastrointestinal: Negative.  Negative for abdominal pain, change in bowel habit, diarrhea, hematemesis, hematochezia, melena and vomiting.   Genitourinary: Negative.  Negative for flank pain, frequency, hematuria and urgency.   Neurological: Positive for loss of balance. Negative for difficulty with concentration, dizziness and seizures.   Psychiatric/Behavioral: Positive for depression. Negative for altered mental status and suicidal ideas. The patient has insomnia. The patient is not nervous/anxious.    Allergic/Immunologic: Negative for HIV exposure.             Past Medical History:   Diagnosis Date    Cataract     extraction    Depression     Diabetes mellitus type I     Glaucoma     Hypertension   "      Past Surgical History:   Procedure Laterality Date    HYSTERECTOMY      JOINT REPLACEMENT         Review of patient's allergies indicates:   Allergen Reactions    Iodine      Shortness of breath^       Current Outpatient Prescriptions   Medication Sig Dispense Refill    benzonatate (TESSALON) 100 MG capsule       escitalopram oxalate (LEXAPRO) 10 MG tablet       furosemide (LASIX) 20 MG tablet       gabapentin (NEURONTIN) 300 MG capsule Take 1 capsule (300 mg total) by mouth 3 (three) times daily. 90 capsule 5    glipiZIDE (GLUCOTROL) 10 MG tablet       hydrochlorothiazide (HYDRODIURIL) 25 MG tablet       hydrocodone-acetaminophen 5-325mg (NORCO) 5-325 mg per tablet Take 1 tablet by mouth every 6 (six) hours as needed for Pain.      hydrOXYzine HCl (ATARAX) 25 MG tablet       lisinopril (PRINIVIL,ZESTRIL) 20 MG tablet       meclizine (ANTIVERT) 25 mg tablet       metformin (GLUCOPHAGE) 500 MG tablet Take 500 mg by mouth 2 (two) times daily with meals.      metoprolol succinate (TOPROL-XL) 25 MG 24 hr tablet Take 25 mg by mouth once daily.      timolol maleate 0.5% (TIMOPTIC) 0.5 % Drop INT 1 DROP  INTO EACH EYE BID  4    triamcinolone acetonide 0.1% (KENALOG) 0.1 % ointment        No current facility-administered medications for this visit.        History reviewed. No pertinent family history.    Social History     Social History    Marital status: Single     Spouse name: N/A    Number of children: N/A    Years of education: N/A     Occupational History    Not on file.     Social History Main Topics    Smoking status: Current Every Day Smoker    Smokeless tobacco: Not on file    Alcohol use No    Drug use: No    Sexual activity: No     Other Topics Concern    Not on file     Social History Narrative       Objective:     Vitals:    04/19/17 0709   BP: (!) 166/58   Pulse: 64   Resp: 20   Temp: 98.1 °F (36.7 °C)   TempSrc: Oral   Weight: 81 kg (178 lb 9.2 oz)   Height: 5' 1" (1.549 m) "   PainSc:   8   PainLoc: Knee       GEN:  Well developed, well nourished.  Uncomfortable in exam room.   HEENT:  No trauma.  Mucous membranes moist.  Nares patent bilaterally.  PSYCH: Normal affect. Thought content appropriate.  CHEST:  Breathing symmetric.  No audible wheezing.  ABD: Soft, non-distended.  SKIN:  Warm, pink, dry.  No rash on exposed areas.    EXT:  No cyanosis, clubbing.  No color change or changes in nail or hair growth.  Bilateral lower extremity pitting edema to mid thigh.  NEURO/MUSCULOSKELETAL:  Fully alert, oriented, and appropriate. Speech normal raghu. No cranial nerve deficits.   Gait: Antalgic, ambulating with a rolling walker.      Physical exam  Present trendelenburg sign bilaterally.   Motor Strength: 5/5 motor strength throughout lower extremities.   Sensory: No sensory deficit in the lower extremities.   Reflexes:  2+ and symmetric throughout.  Downgoing Babinski's bilaterally.  No clonus or spasticity.  L-Spine:  Full ROM with pain on Extension. Negative facet loading bilaterally.  Negative SLR bilaterally.  Positive femoral stretch on right.  SI Joint/Hip: Negative NYA and FADIR on right  Minimal TTP over right hip.  No TTP over lumbar paraspinals, bilateral SI joints, left hip, piriformis muscles, or GTB.        Imaging:      MRI of her HPI brought at initial visit.  Brief review of the images at that visit revealed no gross abnormality and no fracture.    MRI Lumbar Spine:    Findings:    There is grade 1 anterolisthesis of L3 on L4 grade 1 anterolisthesis of L4 on L5.  There is marrow edema along the appearance of L3 and superior endplate of L4.  There is minimal edema in the superior endplate of L5.  The conus terminates at the level of L1/L2 disc space.    Again is noted degenerative disease lower thoracic spine.  Disc space narrowing and broad-based disc bulge at T11/T12 is not changed.    T12/L1: There is moderate disc space narrowing with a small broad-based disc  bulge.    L1/2: There is moderate disc space narrowing.  There is no disc bulge.  There is moderate bilateral facet hypertrophy.  There is no significant canal or foraminal narrowing.    L2/3: There is moderate disc space narrowing.  There is a broad-based disc bulge.  There is facet hypertrophy.  There is mild narrowing of the bilateral neural foramen.    L3/4: There is severe disc space narrowing.  There is severe facet arthropathy.  There is a severe broad-based disc bulge with superimposed disc extrusion with minimal superior migration.  There is severe narrowing of the central canal to a residual diameter of 3 m in greatest AP dimension.  There is severe bilateral neural foraminal narrowing and narrowing of the bilateral lateral recess.  This compression of cauda equina with redundancy of the nerve roots.  This level.    L4/5: There is severe disc space narrowing.  There is severe facet arthropathy.  There is a large broad-based disc bulge greater on the right.  There is severe narrowing of the right neural foramen.  There is moderate to severe narrowing of the left neural foramen.  There is severe narrowing of the central canal to residual 4 mm in greatest diameter.    L5/S1: There is disc space narrowing with a broad-based disc bulge.  There is moderate narrowing of bilateral neural foramen.  There is moderate narrowing of the central canal to residual 7 mm in AP diameter.    Impression:  1. Broad-based disc bulge at L3/4 with superimposed disc extrusion resulting in severe narrowing of the central canal and bilateral neural foramen as well as severe narrowing of bilateral lateral process.  There is impingement of the descending cauda equina.  This slightly worse compared to the prior exam.  2. Broad-based disc bulge L4/5 with severe narrowing of the central canal and severe right and moderate severe left neural foraminal narrowing.  This is also slightly worsened from prior exam.      Assessment:      Encounter Diagnoses   Name Primary?    Spinal stenosis of lumbar region with radiculopathy Yes    DDD (degenerative disc disease), lumbar     Right lumbar radiculitis     Debility     Right hip pain     H/O total knee replacement, right     Bilateral lower extremity edema        Plan:     Laura was seen today for knee pain and low-back pain.    Diagnoses and all orders for this visit:    Spinal stenosis of lumbar region with radiculopathy    DDD (degenerative disc disease), lumbar    Right lumbar radiculitis    Debility    Right hip pain    H/O total knee replacement, right    Bilateral lower extremity edema       Her pain is consistent with the above.    We discussed the assessment and recommendations.  All available images were reviewed. We discussed the disease process, prognosis, treatment plan, and risks and benefits. The patient is aware of the risks and benefits of the medications being prescribed, common side effects, and proper usage. The following is the plan we agreed on:     1. Advised to try breaking Norco in half.   2. Consider stopping gabapentin as this can contribute to BLE edema  3. I will contact her PCP in regards to how to proceed   4. Will hold off on healthy back until this acute issue is resolved. Once the edema is addressed, I would like for her to do this program.  If she is able to complete this then she may be fit enough for FRP which I believe she would benefit from.   5. Discussed SCS pt is not interisted at this time  6. RTC in 6 weeks or sooner if needed.    Greater than 25 minutes spent in total in todays visit with the patient, with more than half that time direct face to face counseling and education with the patient today. We discussed the disease process, prognosis, treatment plan, and risks and benefits.    I have seen the patient with the resident physician.  I have performed my own history and physical exam and we have come up with the above plan.  The patient is  in agreement with our plan.    The above plan and management options were discussed at length with patient. Patient is in agreement with the above and verbalized understanding. It will be communicated with the referring physician via electronic record, fax, or mail.    Ortho/SPM Exam

## 2017-04-20 ENCOUNTER — TELEPHONE (OUTPATIENT)
Dept: PAIN MEDICINE | Facility: CLINIC | Age: 77
End: 2017-04-20

## 2017-04-20 NOTE — TELEPHONE ENCOUNTER
Patient would like to speak directly to you please call. Patient states its regarding a fluid pill and also wondering if you had spoken to other PCP.    Please advise.

## 2017-04-20 NOTE — TELEPHONE ENCOUNTER
----- Message from Ashley Mcqueen sent at 4/19/2017  1:12 PM CDT -----  _  1st Request  _  2nd Request  _  3rd Request        Who: patient    Why: pt is calling to see if you had a chance to talk to her PCP, please call pt     What Number to Call Back:[phone]    When to Expect a call back: (Before the end of the day)   -- if the call is after 12:00, the call back will be tomorrow.

## 2017-04-21 NOTE — TELEPHONE ENCOUNTER
----- Message from Mercedez Levi sent at 4/21/2017  2:28 PM CDT -----  Contact: ARIELLE LINARES [[id number]]  _x  1st Request  _  2nd Request  _  3rd Request        Who: ARIELLE LINARES [[id number]]    Why: patient states she would like to know when she needs to make a follow up appt     What Number to Call Back: [phone]    When to Expect a call back: (Before the end of the day)   -- if call after 3:00 call back will be tomorrow.

## 2017-04-21 NOTE — TELEPHONE ENCOUNTER
Patient called to schedule her 6 week f/u appointment. Staff scheduled patient for 5/29/17 at 8:15am.    Patient verbalized understanding of appointment date and time staff will mail out appointment slip.

## 2017-05-01 ENCOUNTER — CLINICAL SUPPORT (OUTPATIENT)
Dept: REHABILITATION | Facility: OTHER | Age: 77
End: 2017-05-01
Attending: PHYSICAL MEDICINE & REHABILITATION
Payer: MEDICARE

## 2017-05-01 DIAGNOSIS — M51.36 DDD (DEGENERATIVE DISC DISEASE), LUMBAR: Primary | ICD-10-CM

## 2017-05-01 PROCEDURE — 97110 THERAPEUTIC EXERCISES: CPT | Performed by: PHYSICAL MEDICINE & REHABILITATION

## 2017-05-01 PROCEDURE — 97163 PT EVAL HIGH COMPLEX 45 MIN: CPT | Performed by: PHYSICAL MEDICINE & REHABILITATION

## 2017-05-01 PROCEDURE — G8978 MOBILITY CURRENT STATUS: HCPCS | Mod: CL | Performed by: PHYSICAL MEDICINE & REHABILITATION

## 2017-05-01 PROCEDURE — G8979 MOBILITY GOAL STATUS: HCPCS | Mod: CK | Performed by: PHYSICAL MEDICINE & REHABILITATION

## 2017-05-01 NOTE — PLAN OF CARE
[facility] BACK PHYSICAL THERAPY   LUMBAR SPINE EVALUATION    Date:  5/1/17, 8:20-9:50 am    Precautions:  DM  Right knee replaced  Laminectomy L4 L5 early 2000's  Uses rolator walker due to leg symptoms and balance, fall risk, no recent falls    Pattern: 3  Right leg Movement responder -      Flexion responder     Eval date:  5/1/17  Reassessment due:  6/1/17    POC Requested....  5/1/17  Next POC due...    Imaging from [facility]:  MRI EPIC  Impression:  1. Broad-based disc bulge at L3/4 with superimposed disc extrusion resulting in severe narrowing of the central canal and bilateral neural foramen as well as severe narrowing of bilateral lateral process. There is impingement of the descending cauda equina. This slightly worse compared to the prior exam.  2. Broad-based disc bulge L4/5 with severe narrowing of the central canal and severe right and moderate severe left neural foraminal narrowing. This is also slightly worsened from prior exam.    Subjective     PMH:  HYSTERECTOMY     JOINT REPLACEMENT       Back laminectomy 2000's      Work: retired  Leisure: stays home due to leg symptoms  Functional disability from previous episode:not on disability  Goals: she would like to go to [place] to play games and visit and she would like to volunteer at store       History of present illness:  She reports she has symptoms for over 20 years.   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   She had a laminectomy for sharp episode of back pain.  Then she started  with right knee pain and had right knee replacement.  After knee replacement, she started having symptoms in right leg.   She feels she never recovered the way she should have.  She has had therapy, she has had back injections.  She has been managed by [facility], but she feels her right leg symptoms are getting worse.  Currently she has no back pain.  She has all right leg symptoms. Right leg pain starts in the buttock,  And goes to her  "ankle.  She describes it as "catch" or sharp pains.  She also has numbness and tingling in leg.   She is DM and also has some tingling in feet.  Right leg symptoms are constant, 4/10 at best,  8/10 now and 10/10 at worst.      Worse:  Standing 15 min ( needs walker to stand longer than 1 min)      Walking 10 min, has to have walker    getting up from sitting   Rolling and moving in bed     Better: nothing - maybe bending over cart, sometimes recliner      Disturbed sleep: yes, sometime she sleeps in chair -recliner    Previous treatments:  Therapy, injections, back sx, knee sx        SPECIFIC QUESTIONS  Cough  Sneeze  Strain neg  Imaging:  yes  Night pain:  neg  Accidents: neg  Unexplained weight loss:neg  Bowel/bladder: neg    Pattern of pain questions:  1.  Where is your pain the worst? Right leg  2.  Is your pain constant or intermittent? constant  3. Does bending forward make your typical pain worse?  better  4. Since the start of your back pain, has there been a change in your bowel or bladder?  no  5.  What can't you do now that you use to?   Why?  she would like to go to [employer] to play games and visit and she would like to volunteer at store              Objective       No environmental, cultural, spiritual, developmental or education needs expressed or noted    POSTURE  Sitting: poor seated and standing flexed  Standing: fair  Lordosis: fair  Lateral shift: not noted    Correction of posture: no change  Relevant: not suggested at this time    Other observations:  amb with rolator walker and flexed gait  Sit to stand with need for hands  Stands without support 2 min, stands flexed, keeps balance, notes right leg pain worse  Stands and reaches beyond base of support and can close eyes 10 sec  Can walk 10 feet without walker, wide gait, no loss of balance  Unable to SLS  Unable to walk on toes or heels  Needs railing for stairs  Unable to squat         NEUROLOGICAL  Motor deficit: generally 3-3+/5 very " poor strength  -hip flexion (L2)  3+/5  -knee extension  3+/5  -dorsiflexion seated 4-5  -EHL (L5)  3+/5  -SLS (trendelenberg L4) unable, hip abd 3+/5  Hip ext 3+/5      Sensory deficit: intact  Reflexes: equal bilat  SLR:neg  Femoral nerve stretch test: neg  Saddle Sensation:  intact  Upper motor test:  Neg clonus     MOVEMENT LOSS : back  Flexion:     Major loss  Extension:   Major loss  Side gliding RIGHT:     Mod loss   Side gliding LEFT:      Mod loss    TEST MOVEMENTS:   Pre test pain level: right leg pain 8/10  Repeated flexion in standing:no effect  Repeated extension in standing: worse  Repeated flexion in lying: slightly better with ball, worse without ball, encouraged ball purchase  Repeated extensions prone: not tested      Pt performed baseline isometric testing using the Med X equipment.  The test was conducted by the physical therapist.    [facility] 2017   Visit Number 1   VAS Pain Rating 4   Flexion in Lying 10   Lumbar Extension Seat Pad 2   Femur Restraint 4   Top Dead Center 24   Counterweight 95   Lumbar Flexion 30   Lumbar Extension 12   Lumbar Peak Torque 69   Ice - Sitting 10           Baseline IM Testing Results:   Date of testin17  ROM 12-30 deg   Max Peak Torque 69 ft lbs    Min Peak Torque 52 ft lbs    Flex/Ext Ratio 1.3/1   % below normative data 52% below           Treatment       Patient received a handout regarding anticipated muscular soreness following the isometric test and strategies for management were reviewed with patient including using ice and rest.     Patient received education on the healthy back program, purpose of the isometric test, progression of back strengthening program, as well as wellness approach and general body strengthening.  Details of the    program were discussed as well.  Discussed that patient should feel support/pressure from med ex supports but no discomfort and patient expressed understanding.  Patient informed they should tell us  "of any joint pain or symptoms when exercising, and expressed understanding.    HEP started as follows:  -handouts given regarding back care, with information regarding posture, body mech, ergonomics, and components of good exercise program  -Patient received education regarding proper posture and body mechanics.  Malgorzata hyman tried,may revisit in future  -discussed concept of developing "tool box" or "strategies, using positions or exercises to reduce symptoms.  Discussed using these tools to reduce symptoms, and also to prevent symptoms if able.          -Started HEP of   --gave top 10 tips handouts on back and neck care and discusses sitting posture, use of lumbar roll, standing  Posture, correct lifting techniques, need to exercise and encouraged walking, drinking water, healthy diet, regular sleep  -gave smoking cessation information at [facility], patient is smoker  -flexion in lie with ball 3/day and z lie with ball 10 min , ball informtion given    ..... And encouraged patient to note effect           (patient has handouts and demonstrated and expressed understanding of)        Assessment     PT diagnosis: back pain, stenosis, dysfunction,very poor strength back and general strength poor      Pattern: 3  Right leg Movement responder -      Flexion responder         Medical necessity is demonstrated by the following problem list.    Poor posture and body mech  Poor strength on med ex isometric stress test  Reduced ROM on med ex lumbar isometric test  Low back pain    History  Co-morbidities and personal factors that may impact the plan of care Examination  Body Structures and Functions, activity limitations and participation restrictions that may impact the plan of care Clinical Presentation   Decision Making/ Complexity Score   Co-morbidities:             DM  Right knee replaced  DM  Laminectomy L4 L5 early 2000's      Personal Factors:   Uses walker due to leg pain and weak legs Body Regions:back, LE    Body " Systems: musculoskeletal and neuromuscular    -Reduced lumbar ROM actively and with function  -reduced back strength  -Standing 15 min ( needs walker to stand longer than 1 min)   -  Walking 10 min, has to have walker    amb with rolator walker and flexed gait  Sit to stand with need for hands  Stands without support 2 min, stands flexed, keeps balance, notes right leg pain worse  Stands and reaches beyond base of support and can close eyes 10 sec  Can walk 10 feet without walker, wide gait, no loss of balance  Unable to SLS  Unable to walk on toes or heels  Needs railing for stairs  Unable to squat        Activity limitations:   Reduced ability to stand/walk/sit  Sleep effected      Participation Restrictions: does not go out               Evolving clinical presentation with unstable and unpredictable characteristics                 High         Based on the above history, physical examination, and baseline IM testing, an active physical therapy program is recommended.    Prognosis is: poor due to comorbities and general strength and mobility issues    GOALS: Pt is in agreement with the following goals.      Short term goals: 5 weeks or 10 visits  1.  Pt will demonstrate increased lumbar ROM as measured by med ex by at least 3 degrees from the initial ROM value with improvements noted in functional ROM and ability to perform ADL  2.  Pt will demonstrate increased maximum isometric torque value by 5% when compared to the initial value resulting in improved ability to function, stand, walk, lift items.  3.  Pt will tolerate regular 5% increases in dynamic weight loads on all machines  4.  Patient report a reduction in worst pain score by 1-2 points for improved tolerance during work and recreational activities  5.  Pt able to perform HEP correctly with minimal cueing or supervision for therapist  6. Pt will demonstrate improvement in flexion/extension strength ratio compared in initial value    Long term goals: 10  weeks or 20 visits  1. Pt will demonstrate increased lumbar ROM by at least 6 degrees from initial ROM value, resulting in improved ability to perform functional fwd bending while standing and sitting.    2. Pt will demonstrate increased maximum isometric torque value by 10% when compared to the initial value, resulting in improved ability to perform bending, lifting, and carrying activities safely, confidently, and 4/10 pain or less.   3. Pt will be able to ambulate community distances safely, confidently, and 4/10 pain or less.  Return to community center  4. Pt to demonstrate ability to independently control and reduce their pain through posture positioning and mechanical movements throughout day.  Go out with friends or go to store  5. Pt able to sleep through the night without waking with c/o pain. Sleep in bed not chair  6. Pt able to perform household cooking/cleaning ADLS safely, confidently, and 4/10 pain or less.  7. Pt to be able to perform self care and grooming ADLs safely, confidently, independently, and4/10 pain or less.   8. Pt will be able to ascend/descend 1 flight of stairs reciprocally with use of unilateral handrail for safety, confidently and 2/10 pain or less.        OUTCOMES:  Initial FOTO:  78 % limited CL  FOTO Goal: 40-60 % limited CK        PLAN   Outpatient physical therapy 2x/weekly for 13 weeks or 20 visits to include:   -a program of progressive, resisted exercises to strengthen spine musculature based on the pt's initial IM maximum torque value  -biomechanical and mobility maneuvers   -instruction in proper posture and body mechanics   -aerobic exercises  -home maintenance program  -strengthening of supporting musculature  -any other treatment deemed necessary for pt achieve established goals which may include: ice, joint mobilization, soft tissue mobilization, and modalities prn.      Patient may be seen by PTA as part of plan of care.  Face to face conferences will be  "held.        "I certify the need for these services furnished under this plan of treatment and while under my care."    ____________________________________  Physician/Referring Practitioner    _______________  Date of Signature        "

## 2017-05-01 NOTE — PLAN OF CARE
[facility] BACK PHYSICAL THERAPY   LUMBAR SPINE EVALUATION    Date:  5/1/17, 8:20-9:50 am    Precautions:  DM  Right knee replaced  Laminectomy L4 L5 early 2000's  Uses rolator walker due to leg symptoms and balance, fall risk, no recent falls    Pattern: 3  Right leg Movement responder -      Flexion responder     Eval date:  5/1/17  Reassessment due:  6/1/17    POC Requested....  5/1/17  Next POC due...    Imaging from [facility]:  MRI EPIC  Impression:  1. Broad-based disc bulge at L3/4 with superimposed disc extrusion resulting in severe narrowing of the central canal and bilateral neural foramen as well as severe narrowing of bilateral lateral process. There is impingement of the descending cauda equina. This slightly worse compared to the prior exam.  2. Broad-based disc bulge L4/5 with severe narrowing of the central canal and severe right and moderate severe left neural foraminal narrowing. This is also slightly worsened from prior exam.    Subjective     PMH:  HYSTERECTOMY     JOINT REPLACEMENT       Back laminectomy 2000's      Work: retired  Leisure: stays home due to leg symptoms  Functional disability from previous episode:not on disability  Goals: she would like to go to [place] to play games and visit and she would like to volunteer at store       History of present illness:  She reports she has symptoms for over 20 years.   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   She had a laminectomy for sharp episode of back pain.  Then she started  with right knee pain and had right knee replacement.  After knee replacement, she started having symptoms in right leg.   She feels she never recovered the way she should have.  She has had therapy, she has had back injections.  She has been managed by [facility], but she feels her right leg symptoms are getting worse.  Currently she has no back pain.  She has all right leg symptoms. Right leg pain starts in the buttock,  And goes to her  "ankle.  She describes it as "catch" or sharp pains.  She also has numbness and tingling in leg.   She is DM and also has some tingling in feet.  Right leg symptoms are constant, 4/10 at best,  8/10 now and 10/10 at worst.      Worse:  Standing 15 min ( needs walker to stand longer than 1 min)      Walking 10 min, has to have walker    getting up from sitting   Rolling and moving in bed     Better: nothing - maybe bending over cart, sometimes recliner      Disturbed sleep: yes, sometime she sleeps in chair -recliner    Previous treatments:  Therapy, injections, back sx, knee sx        SPECIFIC QUESTIONS  Cough  Sneeze  Strain neg  Imaging:  yes  Night pain:  neg  Accidents: neg  Unexplained weight loss:neg  Bowel/bladder: neg    Pattern of pain questions:  1.  Where is your pain the worst? Right leg  2.  Is your pain constant or intermittent? constant  3. Does bending forward make your typical pain worse?  better  4. Since the start of your back pain, has there been a change in your bowel or bladder?  no  5.  What can't you do now that you use to?   Why?  she would like to go to [place] to play games and visit and she would like to volunteer at store              Objective       No environmental, cultural, spiritual, developmental or education needs expressed or noted    POSTURE  Sitting: poor seated and standing flexed  Standing: fair  Lordosis: fair  Lateral shift: not noted    Correction of posture: no change  Relevant: not suggested at this time    Other observations:  amb with rolator walker and flexed gait  Sit to stand with need for hands  Stands without support 2 min, stands flexed, keeps balance, notes right leg pain worse  Stands and reaches beyond base of support and can close eyes 10 sec  Can walk 10 feet without walker, wide gait, no loss of balance  Unable to SLS  Unable to walk on toes or heels  Needs railing for stairs  Unable to squat         NEUROLOGICAL  Motor deficit: generally 3-3+/5 very " poor strength  -hip flexion (L2)  3+/5  -knee extension  3+/5  -dorsiflexion seated 4-5  -EHL (L5)  3+/5  -SLS (trendelenberg L4) unable, hip abd 3+/5  Hip ext 3+/5      Sensory deficit: intact  Reflexes: equal bilat  SLR:neg  Femoral nerve stretch test: neg  Saddle Sensation:  intact  Upper motor test:  Neg clonus     MOVEMENT LOSS : back  Flexion:     Major loss  Extension:   Major loss  Side gliding RIGHT:     Mod loss   Side gliding LEFT:      Mod loss    TEST MOVEMENTS:   Pre test pain level: right leg pain 8/10  Repeated flexion in standing:no effect  Repeated extension in standing: worse  Repeated flexion in lying: slightly better with ball, worse without ball, encouraged ball purchase  Repeated extensions prone: not tested      Pt performed baseline isometric testing using the Med X equipment.  The test was conducted by the physical therapist.    [facility] 2017   Visit Number 1   VAS Pain Rating 4   Flexion in Lying 10   Lumbar Extension Seat Pad 2   Femur Restraint 4   Top Dead Center 24   Counterweight 95   Lumbar Flexion 30   Lumbar Extension 12   Lumbar Peak Torque 69   Ice - Sitting 10           Baseline IM Testing Results:   Date of testin17  ROM 12-30 deg   Max Peak Torque 69 ft lbs    Min Peak Torque 52 ft lbs    Flex/Ext Ratio 1.3/1   % below normative data 52% below           Treatment       Patient received a handout regarding anticipated muscular soreness following the isometric test and strategies for management were reviewed with patient including using ice and rest.     Patient received education on the healthy back program, purpose of the isometric test, progression of back strengthening program, as well as wellness approach and general body strengthening.  Details of the    program were discussed as well.  Discussed that patient should feel support/pressure from med ex supports but no discomfort and patient expressed understanding.  Patient informed they should tell us  "of any joint pain or symptoms when exercising, and expressed understanding.    HEP started as follows:  -handouts given regarding back care, with information regarding posture, body mech, ergonomics, and components of good exercise program  -Patient received education regarding proper posture and body mechanics.  Malgorzata hyman tried,may revisit in future  -discussed concept of developing "tool box" or "strategies, using positions or exercises to reduce symptoms.  Discussed using these tools to reduce symptoms, and also to prevent symptoms if able.          -Started HEP of   --gave top 10 tips handouts on back and neck care and discusses sitting posture, use of lumbar roll, standing  Posture, correct lifting techniques, need to exercise and encouraged walking, drinking water, healthy diet, regular sleep  -gave smoking cessation information at [facility], patient is smoker  -flexion in lie with ball 3/day and z lie with ball 10 min , ball informtion given    ..... And encouraged patient to note effect           (patient has handouts and demonstrated and expressed understanding of)        Assessment     PT diagnosis: back pain, stenosis, dysfunction,very poor strength back and general strength poor      Pattern: 3  Right leg Movement responder -      Flexion responder         Medical necessity is demonstrated by the following problem list.    Poor posture and body mech  Poor strength on med ex isometric stress test  Reduced ROM on med ex lumbar isometric test  Low back pain    History  Co-morbidities and personal factors that may impact the plan of care Examination  Body Structures and Functions, activity limitations and participation restrictions that may impact the plan of care Clinical Presentation   Decision Making/ Complexity Score   Co-morbidities:             DM  Right knee replaced  DM  Laminectomy L4 L5 early 2000's      Personal Factors:   Uses walker due to leg pain and weak legs Body Regions:back, LE    Body " Systems: musculoskeletal and neuromuscular    -Reduced lumbar ROM actively and with function  -reduced back strength  -Standing 15 min ( needs walker to stand longer than 1 min)   -  Walking 10 min, has to have walker    amb with rolator walker and flexed gait  Sit to stand with need for hands  Stands without support 2 min, stands flexed, keeps balance, notes right leg pain worse  Stands and reaches beyond base of support and can close eyes 10 sec  Can walk 10 feet without walker, wide gait, no loss of balance  Unable to SLS  Unable to walk on toes or heels  Needs railing for stairs  Unable to squat        Activity limitations:   Reduced ability to stand/walk/sit  Sleep effected      Participation Restrictions: does not go out               Evolving clinical presentation with unstable and unpredictable characteristics                 High         Based on the above history, physical examination, and baseline IM testing, an active physical therapy program is recommended.    Prognosis is: poor due to comorbities and general strength and mobility issues    GOALS: Pt is in agreement with the following goals.      Short term goals: 5 weeks or 10 visits  1.  Pt will demonstrate increased lumbar ROM as measured by med ex by at least 3 degrees from the initial ROM value with improvements noted in functional ROM and ability to perform ADL  2.  Pt will demonstrate increased maximum isometric torque value by 5% when compared to the initial value resulting in improved ability to function, stand, walk, lift items.  3.  Pt will tolerate regular 5% increases in dynamic weight loads on all machines  4.  Patient report a reduction in worst pain score by 1-2 points for improved tolerance during work and recreational activities  5.  Pt able to perform HEP correctly with minimal cueing or supervision for therapist  6. Pt will demonstrate improvement in flexion/extension strength ratio compared in initial value    Long term goals: 10  weeks or 20 visits  1. Pt will demonstrate increased lumbar ROM by at least 6 degrees from initial ROM value, resulting in improved ability to perform functional fwd bending while standing and sitting.    2. Pt will demonstrate increased maximum isometric torque value by 10% when compared to the initial value, resulting in improved ability to perform bending, lifting, and carrying activities safely, confidently, and 4/10 pain or less.   3. Pt will be able to ambulate community distances safely, confidently, and 4/10 pain or less.  Return to community center  4. Pt to demonstrate ability to independently control and reduce their pain through posture positioning and mechanical movements throughout day.  Go out with friends or go to store  5. Pt able to sleep through the night without waking with c/o pain. Sleep in bed not chair  6. Pt able to perform household cooking/cleaning ADLS safely, confidently, and 4/10 pain or less.  7. Pt to be able to perform self care and grooming ADLs safely, confidently, independently, and4/10 pain or less.   8. Pt will be able to ascend/descend 1 flight of stairs reciprocally with use of unilateral handrail for safety, confidently and 2/10 pain or less.        OUTCOMES:  Initial FOTO:  78 % limited CL  FOTO Goal: 40-60 % limited CK        PLAN   Outpatient physical therapy 2x/weekly for 13 weeks or 20 visits to include:   -a program of progressive, resisted exercises to strengthen spine musculature based on the pt's initial IM maximum torque value  -biomechanical and mobility maneuvers   -instruction in proper posture and body mechanics   -aerobic exercises  -home maintenance program  -strengthening of supporting musculature  -any other treatment deemed necessary for pt achieve established goals which may include: ice, joint mobilization, soft tissue mobilization, and modalities prn.      Patient may be seen by PTA as part of plan of care.  Face to face conferences will be  "held.        "I certify the need for these services furnished under this plan of treatment and while under my care."    ____________________________________  Physician/Referring Practitioner    _______________  Date of Signature        "

## 2017-05-01 NOTE — PROGRESS NOTES
[facility] BACK PHYSICAL THERAPY   LUMBAR SPINE EVALUATION    Date:  5/1/17, 8:20-9:50 am    Precautions:  DM  Right knee replaced  Laminectomy L4 L5 early 2000's  Uses rolator walker due to leg symptoms and balance, fall risk, no recent falls    Pattern: 3  Right leg Movement responder -      Flexion responder     Eval date:  5/1/17  Reassessment due:  6/1/17    POC Requested....  5/1/17  Next POC due...    Imaging from [facility]:  MRI EPIC  Impression:  1. Broad-based disc bulge at L3/4 with superimposed disc extrusion resulting in severe narrowing of the central canal and bilateral neural foramen as well as severe narrowing of bilateral lateral process. There is impingement of the descending cauda equina. This slightly worse compared to the prior exam.  2. Broad-based disc bulge L4/5 with severe narrowing of the central canal and severe right and moderate severe left neural foraminal narrowing. This is also slightly worsened from prior exam.    Subjective     PMH:  HYSTERECTOMY     JOINT REPLACEMENT       Back laminectomy 2000's      Work: retired  Leisure: stays home due to leg symptoms  Functional disability from previous episode:not on disability  Goals: she would like to go to [employer] to play games and visit and she would like to volunteer at store       History of present illness:  She reports she has symptoms for over 20 years.   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   She had a laminectomy for sharp episode of back pain.  Then she started  with right knee pain and had right knee replacement.  After knee replacement, she started having symptoms in right leg.   She feels she never recovered the way she should have.  She has had therapy, she has had back injections.  She has been managed by Physicians, but she feels her right leg symptoms are getting worse.  Currently she has no back pain.  She has all right leg symptoms. Right leg pain starts in the buttock,  And goes to her  "ankle.  She describes it as "catch" or sharp pains.  She also has numbness and tingling in leg.   She is DM and also has some tingling in feet.  Right leg symptoms are constant, 4/10 at best,  8/10 now and 10/10 at worst.      Worse:  Standing 15 min ( needs walker to stand longer than 1 min)      Walking 10 min, has to have walker    getting up from sitting   Rolling and moving in bed     Better: nothing - maybe bending over cart, sometimes recliner      Disturbed sleep: yes, sometime she sleeps in chair -recliner    Previous treatments:  Therapy, injections, back sx, knee sx        SPECIFIC QUESTIONS  Cough  Sneeze  Strain neg  Imaging:  yes  Night pain:  neg  Accidents: neg  Unexplained weight loss:neg  Bowel/bladder: neg    Pattern of pain questions:  1.  Where is your pain the worst? Right leg  2.  Is your pain constant or intermittent? constant  3. Does bending forward make your typical pain worse?  better  4. Since the start of your back pain, has there been a change in your bowel or bladder?  no  5.  What can't you do now that you use to?   Why?  she would like to go to [employer] to play games and visit and she would like to volunteer at store              Objective       No environmental, cultural, spiritual, developmental or education needs expressed or noted    POSTURE  Sitting: poor seated and standing flexed  Standing: fair  Lordosis: fair  Lateral shift: not noted    Correction of posture: no change  Relevant: not suggested at this time    Other observations:  amb with rolator walker and flexed gait  Sit to stand with need for hands  Stands without support 2 min, stands flexed, keeps balance, notes right leg pain worse  Stands and reaches beyond base of support and can close eyes 10 sec  Can walk 10 feet without walker, wide gait, no loss of balance  Unable to SLS  Unable to walk on toes or heels  Needs railing for stairs  Unable to squat         NEUROLOGICAL  Motor deficit: generally 3-3+/5 very " poor strength  -hip flexion (L2)  3+/5  -knee extension  3+/5  -dorsiflexion seated 4-5  -EHL (L5)  3+/5  -SLS (trendelenberg L4) unable, hip abd 3+/5  Hip ext 3+/5      Sensory deficit: intact  Reflexes: equal bilat  SLR:neg  Femoral nerve stretch test: neg  Saddle Sensation:  intact  Upper motor test:  Neg clonus     MOVEMENT LOSS : back  Flexion:     Major loss  Extension:   Major loss  Side gliding RIGHT:     Mod loss   Side gliding LEFT:      Mod loss    TEST MOVEMENTS:   Pre test pain level: right leg pain 8/10  Repeated flexion in standing:no effect  Repeated extension in standing: worse  Repeated flexion in lying: slightly better with ball, worse without ball, encouraged ball purchase  Repeated extensions prone: not tested      Pt performed baseline isometric testing using the Med X equipment.  The test was conducted by the physical therapist.    [facility] 2017   Visit Number 1   VAS Pain Rating 4   Flexion in Lying 10   Lumbar Extension Seat Pad 2   Femur Restraint 4   Top Dead Center 24   Counterweight 95   Lumbar Flexion 30   Lumbar Extension 12   Lumbar Peak Torque 69   Ice - Sitting 10           Baseline IM Testing Results:   Date of testin17  ROM 12-30 deg   Max Peak Torque 69 ft lbs    Min Peak Torque 52 ft lbs    Flex/Ext Ratio 1.3/1   % below normative data 52% below           Treatment       Patient received a handout regarding anticipated muscular soreness following the isometric test and strategies for management were reviewed with patient including using ice and rest.     Patient received education on the healthy back program, purpose of the isometric test, progression of back strengthening program, as well as wellness approach and general body strengthening.  Details of the    program were discussed as well.  Discussed that patient should feel support/pressure from med ex supports but no discomfort and patient expressed understanding.  Patient informed they should tell us  "of any joint pain or symptoms when exercising, and expressed understanding.    HEP started as follows:  -handouts given regarding back care, with information regarding posture, body mech, ergonomics, and components of good exercise program  -Patient received education regarding proper posture and body mechanics.  Malgorzata hyman tried,may revisit in future  -discussed concept of developing "tool box" or "strategies, using positions or exercises to reduce symptoms.  Discussed using these tools to reduce symptoms, and also to prevent symptoms if able.          -Started HEP of   --gave top 10 tips handouts on back and neck care and discusses sitting posture, use of lumbar roll, standing  Posture, correct lifting techniques, need to exercise and encouraged walking, drinking water, healthy diet, regular sleep  -gave smoking cessation information at [facility], patient is smoker  -flexion in lie with ball 3/day and z lie with ball 10 min , ball informtion given    ..... And encouraged patient to note effect           (patient has handouts and demonstrated and expressed understanding of)        Assessment     PT diagnosis: back pain, stenosis, dysfunction,very poor strength back and general strength poor      Pattern: 3  Right leg Movement responder -      Flexion responder         Medical necessity is demonstrated by the following problem list.    Poor posture and body mech  Poor strength on med ex isometric stress test  Reduced ROM on med ex lumbar isometric test  Low back pain    History  Co-morbidities and personal factors that may impact the plan of care Examination  Body Structures and Functions, activity limitations and participation restrictions that may impact the plan of care Clinical Presentation   Decision Making/ Complexity Score   Co-morbidities:             DM  Right knee replaced  DM  Laminectomy L4 L5 early 2000's      Personal Factors:   Uses walker due to leg pain and weak legs Body Regions:back, LE    Body " Systems: musculoskeletal and neuromuscular    -Reduced lumbar ROM actively and with function  -reduced back strength  -Standing 15 min ( needs walker to stand longer than 1 min)   -  Walking 10 min, has to have walker    amb with rolator walker and flexed gait  Sit to stand with need for hands  Stands without support 2 min, stands flexed, keeps balance, notes right leg pain worse  Stands and reaches beyond base of support and can close eyes 10 sec  Can walk 10 feet without walker, wide gait, no loss of balance  Unable to SLS  Unable to walk on toes or heels  Needs railing for stairs  Unable to squat        Activity limitations:   Reduced ability to stand/walk/sit  Sleep effected      Participation Restrictions: does not go out               Evolving clinical presentation with unstable and unpredictable characteristics                 High         Based on the above history, physical examination, and baseline IM testing, an active physical therapy program is recommended.    Prognosis is: poor due to comorbities and general strength and mobility issues    GOALS: Pt is in agreement with the following goals.      Short term goals: 5 weeks or 10 visits  1.  Pt will demonstrate increased lumbar ROM as measured by med ex by at least 3 degrees from the initial ROM value with improvements noted in functional ROM and ability to perform ADL  2.  Pt will demonstrate increased maximum isometric torque value by 5% when compared to the initial value resulting in improved ability to function, stand, walk, lift items.  3.  Pt will tolerate regular 5% increases in dynamic weight loads on all machines  4.  Patient report a reduction in worst pain score by 1-2 points for improved tolerance during work and recreational activities  5.  Pt able to perform HEP correctly with minimal cueing or supervision for therapist  6. Pt will demonstrate improvement in flexion/extension strength ratio compared in initial value    Long term goals: 10  weeks or 20 visits  1. Pt will demonstrate increased lumbar ROM by at least 6 degrees from initial ROM value, resulting in improved ability to perform functional fwd bending while standing and sitting.    2. Pt will demonstrate increased maximum isometric torque value by 10% when compared to the initial value, resulting in improved ability to perform bending, lifting, and carrying activities safely, confidently, and 4/10 pain or less.   3. Pt will be able to ambulate community distances safely, confidently, and 4/10 pain or less.  Return to community center  4. Pt to demonstrate ability to independently control and reduce their pain through posture positioning and mechanical movements throughout day.  Go out with friends or go to store  5. Pt able to sleep through the night without waking with c/o pain. Sleep in bed not chair  6. Pt able to perform household cooking/cleaning ADLS safely, confidently, and 4/10 pain or less.  7. Pt to be able to perform self care and grooming ADLs safely, confidently, independently, and4/10 pain or less.   8. Pt will be able to ascend/descend 1 flight of stairs reciprocally with use of unilateral handrail for safety, confidently and 2/10 pain or less.        OUTCOMES:  Initial FOTO:  78 % limited CL  FOTO Goal: 40-60 % limited CK        PLAN   Outpatient physical therapy 2x/weekly for 13 weeks or 20 visits to include:   -a program of progressive, resisted exercises to strengthen spine musculature based on the pt's initial IM maximum torque value  -biomechanical and mobility maneuvers   -instruction in proper posture and body mechanics   -aerobic exercises  -home maintenance program  -strengthening of supporting musculature  -any other treatment deemed necessary for pt achieve established goals which may include: ice, joint mobilization, soft tissue mobilization, and modalities prn.      Patient may be seen by PTA as part of plan of care.  Face to face conferences will be  "held.        "I certify the need for these services furnished under this plan of treatment and while under my care."    ____________________________________  Physician/Referring Practitioner    _______________  Date of Signature      "

## 2017-05-12 ENCOUNTER — CLINICAL SUPPORT (OUTPATIENT)
Dept: REHABILITATION | Facility: OTHER | Age: 77
End: 2017-05-12
Attending: PHYSICAL MEDICINE & REHABILITATION
Payer: MEDICARE

## 2017-05-12 DIAGNOSIS — M51.36 DDD (DEGENERATIVE DISC DISEASE), LUMBAR: Primary | ICD-10-CM

## 2017-05-12 PROCEDURE — 97110 THERAPEUTIC EXERCISES: CPT | Performed by: PHYSICAL THERAPIST

## 2017-05-12 NOTE — PROGRESS NOTES
[facility]      Name: Laura Curry  Clinic Number: [id number]  Date of Treatment: 2017   Diagnosis:   Encounter Diagnosis   Name Primary?    DDD (degenerative disc disease), lumbar Yes     Physician: Aparna Welch MD    Pain pattern determined: pattern 3  Plan of care signed:   Time in: 735  Time Out: 830  Total Treatment time: 40  Precautions: TKR, HTN, got dizzy on treadmill attempt today,try bike next time.   Visit #: 2      Subjective   Laura reports improvement of symptoms overall trying to do some CANDIE at home.  However, she is not getting a ball as she lives alone and cannot manage getting the ball on the bed and climbing into bed to do the exercises.  Reviewed just bringing knees to chest, lying with knees bent.  She is unable to get on the floor.      Patient reports their pain to be 4/10 on a 0-10 scale with 0 being no pain and 10 being the worst pain imaginable.  Pain Location: lower back and legs     Work and leisure: retired  Pt goals: decrease pain, go to [employer]    Objective       Baseline IM Testing Results:   Date of testin17  ROM 12-30 deg   Max Peak Torque 69 ft lbs    Min Peak Torque 52 ft lbs    Flex/Ext Ratio 1.3/1   % below normative data 52% below             Treatment         Pt was instructed in and performed the following:  Cardiovascular exercise and therapeutic exercise to improve posture, lumbar spine and supporting musculature ROM, strength, and muscular endurance as follows:      [facility] 2017   Visit Number 2   VAS Pain Rating 4   Treadmill Time (in min.) 1   Speed 0.5   Flexion in Lying 10   Lumbar Weight 35   Repetitions 15   Rating of Perceived Exertion 4   Ice - Sitting 10         Peripheral muscle strengthening which included 1 set of 15-20 repetitions at a slow, controlled 7 second per rep pace focused on strengthening supporting musculature for improved body mechanics and functional mobility.  Pt  and therapist focused on proper form during treatment to ensure optimal strengthening of each targeted muscle group.  Machines were utilized including  leg extension, leg curl, chest press,       Written Home Exercises Provided:   Handouts were given to the patient. Pt demo fair understanding of the education provided. Laura demonstrated fair return demonstration of activities.   Lumbar roll use compliance: not yet  Additional exercises taught this treatment session: none    Assessment       Patient is making fair progress towards established goals. Omit treadmill as pt got dizzy after one minute.  Try stationary bike next visit.  Pt did well on Lumbar Med X but needed multiple verbal cues for timing, pace and speed.  Pt will continue to benefit from skilled outpatient physical therapy to address the deficits stated in the impairment chart, provide pt/family education and to maximize pt's level of independence in the home and community environment. Pt's spiritual, cultural and educational needs considered and pt agreeable to plan of care and goals as stated below:     Medical necessity is demonstrated by the following IMPAIRMENTS/PROBLEMS: decreased ROM, weakness LE, lower back pain      GOALS: Pt is in agreement with the following goals.      Short term goals: 5 weeks or 10 visits  1.  Pt will demonstrate increased lumbar ROM as measured by med ex by at least 3 degrees from the initial ROM value with improvements noted in functional ROM and ability to perform ADL  2.  Pt will demonstrate increased maximum isometric torque value by 5% when compared to the initial value resulting in improved ability to function, stand, walk, lift items.  3.  Pt will tolerate regular 5% increases in dynamic weight loads on all machines  4.  Patient report a reduction in worst pain score by 1-2 points for improved tolerance during work and recreational activities  5.  Pt able to perform HEP correctly with minimal cueing or  supervision for therapist  6. Pt will demonstrate improvement in flexion/extension strength ratio compared in initial value    Long term goals: 10 weeks or 20 visits  1. Pt will demonstrate increased lumbar ROM by at least 6 degrees from initial ROM value, resulting in improved ability to perform functional fwd bending while standing and sitting.    2. Pt will demonstrate increased maximum isometric torque value by 10% when compared to the initial value, resulting in improved ability to perform bending, lifting, and carrying activities safely, confidently, and 4/10 pain or less.   3. Pt will be able to ambulate community distances safely, confidently, and 4/10 pain or less.  Return to community center  4. Pt to demonstrate ability to independently control and reduce their pain through posture positioning and mechanical movements throughout day.  Go out with friends or go to store  5. Pt able to sleep through the night without waking with c/o pain. Sleep in bed not chair  6. Pt able to perform household cooking/cleaning ADLS safely, confidently, and 4/10 pain or less.  7. Pt to be able to perform self care and grooming ADLs safely, confidently, independently, and4/10 pain or less.   8. Pt will be able to ascend/descend 1 flight of stairs reciprocally with use of unilateral handrail for safety, confidently and 2/10 pain or less.        OUTCOMES:  Initial FOTO:  78 % limited CL  FOTO Goal: 40-60 % limited CK      Plan   Continue with established Plan of Care towards established PT goals. Review HEP next visit and try bike as pt got dizzy on treadmill

## 2017-05-15 ENCOUNTER — CLINICAL SUPPORT (OUTPATIENT)
Dept: REHABILITATION | Facility: OTHER | Age: 77
End: 2017-05-15
Attending: PHYSICAL MEDICINE & REHABILITATION
Payer: MEDICARE

## 2017-05-15 DIAGNOSIS — M51.36 DDD (DEGENERATIVE DISC DISEASE), LUMBAR: Primary | ICD-10-CM

## 2017-05-15 PROCEDURE — 97110 THERAPEUTIC EXERCISES: CPT | Performed by: PHYSICAL MEDICINE & REHABILITATION

## 2017-05-15 NOTE — PROGRESS NOTES
[facility]      Name: Laura Curry  Clinic Number: [id number]  Date of Treatment: 05/15/2017   Diagnosis:   Encounter Diagnosis   Name Primary?    DDD (degenerative disc disease), lumbar Yes     Physician: Aparna Welch MD    Pain pattern determined: pattern 3 updated to 4 on 5/15/17 with intermittent leg pain  Plan of care signed: 17  POC due 17    Time in: 7:00  Time Out: 830  Total Treatment time: 50  Precautions: TKR, HTN, dizziness, fall     Visit #: 3    Evaluation: 17  Assessment due: 17      Subjective   Laura reports improvement of symptoms overall trying to do some CANDIE at home.  However, she is not getting a ball as she lives alone and cannot manage getting the ball on the bed and climbing into bed to do the exercises.   Her bed is elevated and she admits that she actually does not sleep in the bed as she can not get in and out safely.  She sleeps in recliner and couch.   Discussed trying to lower bed as she can't afford new bed.  Suggested removing box spring and using just mattress.  She will have nephew help her.  She will try to get ball to use on couch, reviewed exercises on couch without using ball.  She denied any pain today.  She also reported she had minimal pain this weekend.    Reviewed just bringing knees to chest, lying with knees bent.  She is unable to get on the floor.  She reported no symptoms after visit.    Patient reports their pain to be 0/10 on a 0-10 scale with 0 being no pain and 10 being the worst pain imaginable.  Pain Location: lower back and legs     Work and leisure: retired  Pt goals: decrease pain, go to [employer]    Objective       Baseline IM Testing Results:   Date of testin17  ROM 12-30 deg increased to 12-42 with ease visit 3   Max Peak Torque 69 ft lbs    Min Peak Torque 52 ft lbs    Flex/Ext Ratio 1.3/1   % below normative data 52% below       amb with rolator walker to therapy and in  gym  Dizziness when she first got up from mat which passed with sitting  Bike tolerated without dizziness, but difficult on knees, reassess on treadmill possibly next visit      Treatment         Pt was instructed in and performed the following:  Cardiovascular exercise and therapeutic exercise to improve posture, lumbar spine and supporting musculature ROM, strength, and muscular endurance as follows:    Supine single knee to chest  Supine lower trunk rotation  Supine knees to chest without ball  Reviewed for home on sofa  Discussed lowering bed by removing box frame so she can sleep in bed versus chair and sofa, she will look into it    [facility] 5/15/2017   Visit Number 3   VAS Pain Rating 0   Treadmill Time (in min.) -   Speed -   Recumbent Bike Seat Pos. 8   Time 3   Flexion in Lying 10   Lumbar Extension Seat Pad -   Femur Restraint -   Top Dead Center -   Counterweight -   Lumbar Flexion 42   Lumbar Extension 12   Lumbar Peak Torque -   Lumbar Weight 35   Repetitions 20   Rating of Perceived Exertion 3   Ice - Sitting 10             Peripheral muscle strengthening which included 1 set of 15-20 repetitions at a slow, controlled 7 second per rep pace focused on strengthening supporting musculature for improved body mechanics and functional mobility.  Pt and therapist focused on proper form during treatment to ensure optimal strengthening of each targeted muscle group.  Machines were utilized including  leg extension, leg curl, chest press, rowing       Written Home Exercises Provided:   Handouts were given to the patient. Pt demo fair understanding of the education provided. Laura demonstrated fair return demonstration of activities.   Single knee to chest on sofa  Double knee to chest on sofa  Lower trunk rotation  Lumbar roll use compliance: not yet  Additional exercises taught this treatment session: none    Assessment       Patient is making fair progress towards established goals.  She  attended today and denied any pain.  Brief dizziness when getting up from mat which passed. She does not lie down much as she doesn't sleep in a bed as her bed is too high -she will get nephew to try to help lower bed.  Reviewed stretches that could be done on couch.   Noted she tolerated visit well and denied pain.  She needed cuing but could do stretches without ball on couch.     Pt did well on Lumbar Med X but needed multiple verbal cues for timing, pace and speed.  Her range was increased to 12-42 and tolerated very well.  Pt will continue to benefit from skilled outpatient physical therapy to address the deficits stated in the impairment chart, provide pt/family education and to maximize pt's level of independence in the home and community environment. Pt's spiritual, cultural and educational needs considered and pt agreeable to plan of care and goals as stated below:     Medical necessity is demonstrated by the following IMPAIRMENTS/PROBLEMS: decreased ROM, weakness LE, lower back pain      GOALS: Pt is in agreement with the following goals.      Short term goals: 5 weeks or 10 visits  1.  Pt will demonstrate increased lumbar ROM as measured by med ex by at least 3 degrees from the initial ROM value with improvements noted in functional ROM and ability to perform ADL  2.  Pt will demonstrate increased maximum isometric torque value by 5% when compared to the initial value resulting in improved ability to function, stand, walk, lift items.  3.  Pt will tolerate regular 5% increases in dynamic weight loads on all machines  4.  Patient report a reduction in worst pain score by 1-2 points for improved tolerance during work and recreational activities  5.  Pt able to perform HEP correctly with minimal cueing or supervision for therapist  6. Pt will demonstrate improvement in flexion/extension strength ratio compared in initial value    Long term goals: 10 weeks or 20 visits  1. Pt will demonstrate increased lumbar  ROM by at least 6 degrees from initial ROM value, resulting in improved ability to perform functional fwd bending while standing and sitting.    2. Pt will demonstrate increased maximum isometric torque value by 10% when compared to the initial value, resulting in improved ability to perform bending, lifting, and carrying activities safely, confidently, and 4/10 pain or less.   3. Pt will be able to ambulate community distances safely, confidently, and 4/10 pain or less.  Return to community center  4. Pt to demonstrate ability to independently control and reduce their pain through posture positioning and mechanical movements throughout day.  Go out with friends or go to store  5. Pt able to sleep through the night without waking with c/o pain. Sleep in bed not chair  6. Pt able to perform household cooking/cleaning ADLS safely, confidently, and 4/10 pain or less.  7. Pt to be able to perform self care and grooming ADLs safely, confidently, independently, and4/10 pain or less.   8. Pt will be able to ascend/descend 1 flight of stairs reciprocally with use of unilateral handrail for safety, confidently and 2/10 pain or less.        OUTCOMES:  Initial FOTO:  78 % limited CL  FOTO Goal: 40-60 % limited CK      Plan   Continue with established Plan of Care towards established PT goals. Review HEP next visit and try bike or treadmill but monitor dizzy on treadmill

## 2017-05-22 ENCOUNTER — CLINICAL SUPPORT (OUTPATIENT)
Dept: REHABILITATION | Facility: OTHER | Age: 77
End: 2017-05-22
Attending: PHYSICAL MEDICINE & REHABILITATION
Payer: MEDICARE

## 2017-05-22 DIAGNOSIS — M51.36 DDD (DEGENERATIVE DISC DISEASE), LUMBAR: Primary | ICD-10-CM

## 2017-05-22 PROCEDURE — 97110 THERAPEUTIC EXERCISES: CPT

## 2017-05-22 NOTE — PROGRESS NOTES
[facility] Therapy Treatment      Name: Laura Curry  Clinic Number: [id number]  Date of Treatment: 2017   Diagnosis:   Encounter Diagnosis   Name Primary?    DDD (degenerative disc disease), lumbar Yes     Physician: Aparna Welch MD    Pain pattern determined: pattern 3 updated to 4 on 5/15/17 with intermittent leg pain  Plan of care signed: 17  POC due 17    Time in: 8:00  Time Out: 9:00  Total Treatment time: 50  Precautions: TKR, HTN, dizziness, fall     Visit #: 4    Evaluation: 17  Assessment due: 17  Face to Face discussion of patient was done between PT and PTA.       Subjective   Laura reports improvement of symptoms overall trying to do some CANDIE at home. She denied any pain today. She has not purchased a theraball, but trying to get one soon. She is partially complaint with her HEP. She reported no symptoms after visit.    Patient reports their pain to be 0/10 on a 0-10 scale with 0 being no pain and 10 being the worst pain imaginable.  Pain Location: lower back and legs     Work and leisure: retired  Pt goals: decrease pain, go to [employer]    Objective       Baseline IM Testing Results:   Date of testin17  ROM 12-30 deg increased to 12-42 with ease visit 3   Max Peak Torque 69 ft lbs    Min Peak Torque 52 ft lbs    Flex/Ext Ratio 1.3/1   % below normative data 52% below       amb with rolator walker to therapy and in gym  Dizziness when she first got up from mat which passed with sitting  Bike tolerated without dizziness, but difficult on knees, reassess on treadmill possibly next visit      Treatment         Pt was instructed in and performed the following:  Cardiovascular exercise and therapeutic exercise to improve posture, lumbar spine and supporting musculature ROM, strength, and muscular endurance as follows:  [facility] 2017   Visit Number 4   VAS Pain Rating -   Treadmill Time (in min.) -   Speed -   Recumbent Bike Seat  Pos. 10   Time 5   Lumbar Weight 38   Repetitions 20   Rating of Perceived Exertion 3   Ice - Sitting No ice b/c she was cold.       Supine single knee to chest  Supine lower trunk rotation  Supine knees to chest without ball  Reviewed for home on sofa  Discussed lowering bed by removing box frame so she can sleep in bed versus chair and sofa, she will look into it              Peripheral muscle strengthening which included 1 set of 15-20 repetitions at a slow, controlled 7 second per rep pace focused on strengthening supporting musculature for improved body mechanics and functional mobility.  Pt and therapist focused on proper form during treatment to ensure optimal strengthening of each targeted muscle group.  Machines were utilized including  leg extension, leg curl, chest press, rowing       Written Home Exercises Provided:   Handouts were given to the patient. Pt demo fair understanding of the education provided. Laura demonstrated fair return demonstration of activities.   Single knee to chest on sofa  Double knee to chest on sofa  Lower trunk rotation  Lumbar roll use compliance: not yet  Additional exercises taught this treatment session: none    Assessment       Patient is making fair progress towards established goals.  She attended today and denied any pain.  Pt c/o dizziness when getting up from mat today but did not pass. /70 and pt did not eat this morning.  Pt given christoph cracker and orange seated and her dizziness did pass.  Reviewed stretches that could be done on couch, bed to high to get in and out of.   Noted she tolerated visit well and denied pain.     Pt did well on Lumbar Med X but needed multiple verbal cues for timing, pace and speed. She may need to be introduced to seated trunk flexion due to pt can get dizzy with lying down.  No LOB or dizziness during and post session.  Pt will continue to benefit from skilled outpatient physical therapy to address the deficits stated in the  impairment chart, provide pt/family education and to maximize pt's level of independence in the home and community environment. Pt's spiritual, cultural and educational needs considered and pt agreeable to plan of care and goals as stated below:     Medical necessity is demonstrated by the following IMPAIRMENTS/PROBLEMS: decreased ROM, weakness LE, lower back pain      GOALS: Pt is in agreement with the following goals.      Short term goals: 5 weeks or 10 visits  1.  Pt will demonstrate increased lumbar ROM as measured by med ex by at least 3 degrees from the initial ROM value with improvements noted in functional ROM and ability to perform ADL  2.  Pt will demonstrate increased maximum isometric torque value by 5% when compared to the initial value resulting in improved ability to function, stand, walk, lift items.  3.  Pt will tolerate regular 5% increases in dynamic weight loads on all machines  4.  Patient report a reduction in worst pain score by 1-2 points for improved tolerance during work and recreational activities  5.  Pt able to perform HEP correctly with minimal cueing or supervision for therapist  6. Pt will demonstrate improvement in flexion/extension strength ratio compared in initial value    Long term goals: 10 weeks or 20 visits  1. Pt will demonstrate increased lumbar ROM by at least 6 degrees from initial ROM value, resulting in improved ability to perform functional fwd bending while standing and sitting.    2. Pt will demonstrate increased maximum isometric torque value by 10% when compared to the initial value, resulting in improved ability to perform bending, lifting, and carrying activities safely, confidently, and 4/10 pain or less.   3. Pt will be able to ambulate community distances safely, confidently, and 4/10 pain or less.  Return to community center  4. Pt to demonstrate ability to independently control and reduce their pain through posture positioning and mechanical movements  throughout day.  Go out with friends or go to store  5. Pt able to sleep through the night without waking with c/o pain. Sleep in bed not chair  6. Pt able to perform household cooking/cleaning ADLS safely, confidently, and 4/10 pain or less.  7. Pt to be able to perform self care and grooming ADLs safely, confidently, independently, and4/10 pain or less.   8. Pt will be able to ascend/descend 1 flight of stairs reciprocally with use of unilateral handrail for safety, confidently and 2/10 pain or less.        OUTCOMES:  Initial FOTO:  78 % limited CL  FOTO Goal: 40-60 % limited CK      Plan   Continue with established Plan of Care towards established PT goals. Review HEP next visit and try bike or treadmill but monitor dizzy on treadmill

## 2017-05-24 ENCOUNTER — CLINICAL SUPPORT (OUTPATIENT)
Dept: REHABILITATION | Facility: OTHER | Age: 77
End: 2017-05-24
Attending: PHYSICAL MEDICINE & REHABILITATION
Payer: MEDICARE

## 2017-05-24 DIAGNOSIS — M51.36 DDD (DEGENERATIVE DISC DISEASE), LUMBAR: Primary | ICD-10-CM

## 2017-05-24 PROCEDURE — G8978 MOBILITY CURRENT STATUS: HCPCS | Mod: CL

## 2017-05-24 PROCEDURE — 97110 THERAPEUTIC EXERCISES: CPT

## 2017-05-24 PROCEDURE — G8979 MOBILITY GOAL STATUS: HCPCS | Mod: CK

## 2017-05-24 NOTE — PROGRESS NOTES
[facility]      Name: Laura Curry  Clinic Number: [id number]  Date of Treatment: 2017   Diagnosis:   Encounter Diagnosis   Name Primary?    DDD (degenerative disc disease), lumbar Yes     Physician: Aparna Welch MD    Pain pattern determined: pattern 3 updated to 4 on 5/15/17 with intermittent leg pain  Plan of care signed: 17  POC due 17    Time in: 8:00  Time Out: 9:00  Total Treatment time: 50  Precautions: TKR, HTN, dizziness, fall     Visit #: 5, GET FOTO NEXT SESSION    Evaluation: 17  Assessment due: 17  Face to Face discussion of patient was done between PT and PTA.       Subjective   Laura reports improvement of symptoms overall trying to do some CANDIE at home. She reported some low back pain when being secured in Med X lumbar extension but improved with readjustment. She did not report any low back pain for the rest of session. She has not purchased a theraball, but trying to get one soon. She is partially complaint with her HEP. She reported no symptoms after visit. Pt reports fall on  when she tripped on vent in floor and hurt left knee badly. She is very concerned about improving her balance.     Patient reports their pain to be 0/10 on a 0-10 scale with 0 being no pain and 10 being the worst pain imaginable.  Pain Location: lower back and legs     Work and leisure: retired  Pt goals: decrease pain, go to senior center    Objective       Baseline IM Testing Results:   Date of testin17  ROM 12-30 deg increased to 12-42 with ease visit 3   Max Peak Torque 69 ft lbs    Min Peak Torque 52 ft lbs    Flex/Ext Ratio 1.3/1   % below normative data 52% below       amb with rolator walker to therapy and in gym  Dizziness when she first got up from mat which passed with sitting  Bike tolerated without dizziness, but difficult on knees, reassess on treadmill possibly next visit      Treatment         Pt was instructed in and performed  the following:  Cardiovascular exercise and therapeutic exercise to improve posture, lumbar spine and supporting musculature ROM, strength, and muscular endurance as follows:    [facility] 5/24/2017   Visit Number 5   VAS Pain Rating 0   Treadmill Time (in min.) -   Speed -   Recumbent Bike Seat Pos. 9   Time 10   Flexion in Lying 10   Flexion in Sitting 10   Lumbar Extension Seat Pad -   Femur Restraint -   Top Dead Center -   Counterweight -   Lumbar Flexion -   Lumbar Extension -   Lumbar Peak Torque -   Lumbar Weight 40   Repetitions 20   Rating of Perceived Exertion 4   Ice - Sitting 10       Supine single knee to chest  Supine lower trunk rotation  Seated lumbar flexion stretches 3 sec hold x10  Seated scapular retractions 10x    Peripheral muscle strengthening which included 1 set of 15-20 repetitions at a slow, controlled 7 second per rep pace focused on strengthening supporting musculature for improved body mechanics and functional mobility.  Pt and therapist focused on proper form during treatment to ensure optimal strengthening of each targeted muscle group.  Machines were utilized including  leg extension, leg curl, chest press, rowing       Written Home Exercises Provided:   Handouts were given to the patient. Pt demo fair understanding of the education provided. Laura demonstrated fair return demonstration of activities.   Single knee to chest on sofa  Double knee to chest on sofa  Lower trunk rotation  Lumbar roll use compliance: not yet  Additional exercises taught this treatment session:   Seated lumbar flexion stretches 3 sec hold x10, 3x daily  Seated scapular retractions 10x, 3x daily     Assessment       Patient is making fair progress towards established goals.  She attended today and denied any pain.  Pt c/o dizziness when getting up from mat today that did pass. Pt able to perform up to 10 minutes on bike with mod assist to maintain feet within paddle. Pt did c/o right knee pain  that did not worsen throughout treatment session. Reviewed supine stretches but pt requires mod v/c to perform and reports difficulty getting into and out of bed safely to perform. Pt may benefit from teaching more seated exercises to improve confidence and compliance with regular exercise. Added seated lumbar flexion and scapular retraction with good pt response. Pt did well on Lumbar Med X but needed multiple verbal cues for timing, pace and speed. Pt did report some initial pain have stabilization in machine but improved after readjustment. Pt requires mod-max supervision for safety when transitioning between machines. No LOB during and post session.  Pt will continue to benefit from skilled outpatient physical therapy to address the deficits stated in the impairment chart, provide pt/family education and to maximize pt's level of independence in the home and community environment. Pt's spiritual, cultural and educational needs considered and pt agreeable to plan of care and goals as stated below:     Medical necessity is demonstrated by the following IMPAIRMENTS/PROBLEMS: decreased ROM, weakness LE, lower back pain      GOALS: Pt is in agreement with the following goals.      Short term goals: 5 weeks or 10 visits  1.  Pt will demonstrate increased lumbar ROM as measured by med ex by at least 3 degrees from the initial ROM value with improvements noted in functional ROM and ability to perform ADL  2.  Pt will demonstrate increased maximum isometric torque value by 5% when compared to the initial value resulting in improved ability to function, stand, walk, lift items.  3.  Pt will tolerate regular 5% increases in dynamic weight loads on all machines  4.  Patient report a reduction in worst pain score by 1-2 points for improved tolerance during work and recreational activities  5.  Pt able to perform HEP correctly with minimal cueing or supervision for therapist  6. Pt will demonstrate improvement in  flexion/extension strength ratio compared in initial value    Long term goals: 10 weeks or 20 visits  1. Pt will demonstrate increased lumbar ROM by at least 6 degrees from initial ROM value, resulting in improved ability to perform functional fwd bending while standing and sitting.    2. Pt will demonstrate increased maximum isometric torque value by 10% when compared to the initial value, resulting in improved ability to perform bending, lifting, and carrying activities safely, confidently, and 4/10 pain or less.   3. Pt will be able to ambulate community distances safely, confidently, and 4/10 pain or less.  Return to community center  4. Pt to demonstrate ability to independently control and reduce their pain through posture positioning and mechanical movements throughout day.  Go out with friends or go to store  5. Pt able to sleep through the night without waking with c/o pain. Sleep in bed not chair  6. Pt able to perform household cooking/cleaning ADLS safely, confidently, and 4/10 pain or less.  7. Pt to be able to perform self care and grooming ADLs safely, confidently, independently, and4/10 pain or less.   8. Pt will be able to ascend/descend 1 flight of stairs reciprocally with use of unilateral handrail for safety, confidently and 2/10 pain or less.        OUTCOMES:  Initial FOTO:  78 % limited CL  FOTO Goal: 40-60 % limited CK  Midpoint FOTO: 61% limited CL   FOTO Goal: 40-60 % limited CK  Plan   Continue with established Plan of Care towards established PT goals. Review HEP next visit and try bike. Pt may benefit from learning more seated exercises to improve compliance and confidence.

## 2017-05-29 ENCOUNTER — OFFICE VISIT (OUTPATIENT)
Dept: PAIN MEDICINE | Facility: CLINIC | Age: 77
End: 2017-05-29
Attending: ANESTHESIOLOGY
Payer: MEDICARE

## 2017-05-29 VITALS
WEIGHT: 178 LBS | TEMPERATURE: 98 F | DIASTOLIC BLOOD PRESSURE: 64 MMHG | HEIGHT: 61 IN | HEART RATE: 51 BPM | SYSTOLIC BLOOD PRESSURE: 150 MMHG | BODY MASS INDEX: 33.61 KG/M2

## 2017-05-29 DIAGNOSIS — M48.061 LUMBAR STENOSIS: ICD-10-CM

## 2017-05-29 DIAGNOSIS — M51.36 DDD (DEGENERATIVE DISC DISEASE), LUMBAR: Primary | ICD-10-CM

## 2017-05-29 DIAGNOSIS — M25.552 PAIN OF LEFT HIP JOINT: ICD-10-CM

## 2017-05-29 DIAGNOSIS — M47.816 LUMBAR FACET ARTHROPATHY: ICD-10-CM

## 2017-05-29 PROCEDURE — 99213 OFFICE O/P EST LOW 20 MIN: CPT | Mod: PBBFAC | Performed by: ANESTHESIOLOGY

## 2017-05-29 PROCEDURE — 99214 OFFICE O/P EST MOD 30 MIN: CPT | Mod: S$PBB,GC,, | Performed by: ANESTHESIOLOGY

## 2017-05-29 PROCEDURE — 99999 PR PBB SHADOW E&M-EST. PATIENT-LVL III: CPT | Mod: PBBFAC,,, | Performed by: ANESTHESIOLOGY

## 2017-05-29 NOTE — PROGRESS NOTES
Subjective:     Patient ID: Laura Curry is a 76 y.o. female.    Chief Complaint: Low-back Pain    Consulted by: No ref. provider found     Disclaimer: This note was generated using voice recognition software.  There may be a typographical errors that were missed during proofreading.      HPI:    Laura Curry is a 76 y.o. female who presents today with right thigh pain that began 4 months ago.  She has a history of a right TKA.  She also has a history of a questionable right hip fracture in 2012.  The pain is worse with walking and standing. Pain is constant.   This pain is described in detail below.    Interval History (11/11/2016):  She returns today for follow up.  She reports that the hip injection provided ~40% relief for 3 days.  Her pain is severe today.  No bladder/bowel incontinence, no saddle anesthesia.      Interval History (12/24/2016):  She returns today for follow up.  She reports that the Right L3 TFESI provided ~ 40% relief.  She did not increase the tramadol as she was still using her old bottle, which said every 12 hours.  She is tolerating the gabapentin 300 mg QHS.    Interval History (1/18/2017):  She returns today for follow up.  She reports that the tramadol is somewhat helpful, but it makes her sleepy.  The gabapentin is also helpful.  She saw a neurosurgeon, who felt she wasn't a surgical candidate.  Her PCP has given her a prescription for Norco that she has not filled yet.    Interval History (03/03/2017):  Ms Curry  returns today for follow up.  She reports that she has  been taking her Gabapentin once or twice a day as it makes her sleepy. She has seen a neurosurgeon who has told her she is not a candidate for surgery. Her PCP has written her a prescription of Narco which she has not filled. Her pain remains a 9/10. She reports a hx of falls and in currently using a walker.     Interval History (4/19/2017):  She returns today for follow up.  She reports that her pain is  "worsening.  She has tried the Norco, but it makes her sleepy.  The gabapentin also makes her sleepy.  She reports 3 falls since her last visit.    She has been helpful for the pain.    Interval History (5/29/17)   Patient returns today for f/u. Since last visit she has been doing healthy back with minimal improvement currently. Pain is mostly unchanged and is mostly in the left posterior leg to the mid calf and "throbbing". She with occasional falls which she attributes to poor balance. Still using Rolator. Complains of some continued sedation with the gabapentin.    Physical Therapy: For her knee.  She was doing her HEP    Non-pharmacologic Treatment: Heating pad does not help much         · TENS? Not tried    Pain Medications:         · Currently taking: Norco infrequently, gabapentin 300 mg BID    · Has tried in the past:  Tylenol, steroid dose pack (helped very little), gabapentin (took once daily for 2 months)    · Has not tried: She was told not to take Advil or Aleve. Lyrica, SNRIs, TCAs, muscle relaxants, topical cream    Blood thinners: None    Interventional Therapies:   · Right hip IA injection: <40% relief  · Right L3 and attempted L4 TFESI: 40% relief, but her BP increased to dangerous levels    Relevant Surgeries: Right TKA    Affecting sleep? Yes    Affecting daily activities? Yes    Depressive symptoms? No          · SI/HI? No    Work status: Retired, worked in [employer] at [employer] for 25 years    Pain Scales  Best: 7/10  Worst: 9/10  Usually: 9/10  Today: 8/10    Low-back Pain   This is a chronic problem. The current episode started more than 1 year ago. The problem occurs constantly. The problem is unchanged. The pain is present in the sacro-iliac. The pain radiates to the left thigh and left knee. The quality of the pain is described as shooting (throbbing). The pain is at a severity of 8/10. The pain is mild. The pain is the same all the time. The symptoms are aggravated by bending and standing " (walking). Associated symptoms include leg pain. Pertinent negatives include no abdominal pain, chest pain, fever, headaches or weight loss. She has tried NSAIDs and muscle relaxant for the symptoms. The treatment provided mild relief.   Knee Pain    The pain is present in the right ankle. This is a chronic problem. The injury was the result of a falling action The problem occurs constantly. The problem has been unchanged. The quality of the pain is described as aching and sharp. The pain is at a severity of 8/10. Associated symptoms include stiffness. Pertinent negatives include no fever or itching. The symptoms are aggravated by activity, walking and standing. She has tried oral narcotics for the symptoms. Physical therapy was not tried.  Leg Pain    Pain location: right thigh. This is a new problem. Episode onset: 4 months ago. The problem occurs constantly. The problem has been gradually worsening. The quality of the pain is described as aching. Associated symptoms include stiffness. Pertinent negatives include no fever or itching. The symptoms are aggravated by sitting, standing and walking. She has tried oral narcotics, other, injection treatment, NSAIDs and rest for the symptoms. Physical therapy was not tried.      Last 3 PDI Scores 5/29/2017 4/19/2017 3/3/2017   Pain Disability Index (PDI) 28 36 57       Review of Systems   Constitution: Negative. Negative for chills, fever, malaise/fatigue, weight gain and weight loss.   HENT: Negative.  Negative for ear pain, headaches and hoarse voice.    Eyes: Negative.  Negative for blurred vision, double vision, pain and visual disturbance.   Cardiovascular: Negative.  Negative for chest pain, dyspnea on exertion and irregular heartbeat.   Respiratory: Positive for cough and wheezing. Negative for shortness of breath.    Endocrine: Negative.  Negative for cold intolerance and heat intolerance.   Hematologic/Lymphatic: Negative for adenopathy and bleeding problem. Does  not bruise/bleed easily.   Skin: Negative.  Negative for color change, itching and rash.   Musculoskeletal: Positive for back pain, joint pain, muscle cramps and stiffness.   Gastrointestinal: Negative.  Negative for abdominal pain, change in bowel habit, diarrhea, hematemesis, hematochezia, melena and vomiting.   Genitourinary: Negative.  Negative for flank pain, frequency, hematuria and urgency.   Neurological: Positive for loss of balance. Negative for difficulty with concentration, dizziness and seizures.   Psychiatric/Behavioral: Positive for depression. Negative for altered mental status and suicidal ideas. The patient has insomnia. The patient is not nervous/anxious.    Allergic/Immunologic: Negative for HIV exposure.             Past Medical History:   Diagnosis Date    Cataract     extraction    Depression     Diabetes mellitus type I     Glaucoma     Hypertension        Past Surgical History:   Procedure Laterality Date    HYSTERECTOMY      JOINT REPLACEMENT         Review of patient's allergies indicates:   Allergen Reactions    Iodine      Shortness of breath^       Current Outpatient Prescriptions   Medication Sig Dispense Refill    benzonatate (TESSALON) 100 MG capsule       escitalopram oxalate (LEXAPRO) 10 MG tablet       furosemide (LASIX) 20 MG tablet       gabapentin (NEURONTIN) 300 MG capsule Take 1 capsule (300 mg total) by mouth 3 (three) times daily. 90 capsule 5    glipiZIDE (GLUCOTROL) 10 MG tablet       hydrochlorothiazide (HYDRODIURIL) 25 MG tablet       hydrocodone-acetaminophen 5-325mg (NORCO) 5-325 mg per tablet Take 1 tablet by mouth every 6 (six) hours as needed for Pain.      hydrOXYzine HCl (ATARAX) 25 MG tablet       lisinopril (PRINIVIL,ZESTRIL) 20 MG tablet       meclizine (ANTIVERT) 25 mg tablet       metformin (GLUCOPHAGE) 500 MG tablet Take 500 mg by mouth 2 (two) times daily with meals.      metoprolol succinate (TOPROL-XL) 25 MG 24 hr tablet Take 25 mg by  "mouth once daily.      timolol maleate 0.5% (TIMOPTIC) 0.5 % Drop INT 1 DROP  INTO EACH EYE BID  4    triamcinolone acetonide 0.1% (KENALOG) 0.1 % ointment        No current facility-administered medications for this visit.        History reviewed. No pertinent family history.    Social History     Social History    Marital status: Single     Spouse name: N/A    Number of children: N/A    Years of education: N/A     Occupational History    Not on file.     Social History Main Topics    Smoking status: Current Every Day Smoker    Smokeless tobacco: Not on file    Alcohol use No    Drug use: No    Sexual activity: No     Other Topics Concern    Not on file     Social History Narrative    No narrative on file       Objective:     Vitals:    05/29/17 0816   Temp: 98.2 °F (36.8 °C)   TempSrc: Oral   Weight: 80.7 kg (178 lb)   Height: 5' 1" (1.549 m)   PainSc:   8   PainLoc: Back       GEN:  Well developed, well nourished.  Uncomfortable in exam room.   HEENT:  No trauma.  Mucous membranes moist.  Nares patent bilaterally.  PSYCH: Normal affect. Thought content appropriate.  CHEST:  Breathing symmetric.  No audible wheezing.  ABD: Soft, non-distended.  SKIN:  Warm, pink, dry.  No rash on exposed areas.    EXT:  No cyanosis, clubbing.  No color change or changes in nail or hair growth.  Bilateral lower extremity pitting edema to mid thigh worse on the left  NEURO/MUSCULOSKELETAL:  Fully alert, oriented, and appropriate. Speech normal raghu. No cranial nerve deficits.   Gait: Antalgic, ambulating with a rolling walker.      Physical exam  Present trendelenburg sign bilaterally.   Motor Strength: 5/5 motor strength throughout lower extremities.   Sensory: No sensory deficit in the lower extremities.   Reflexes:  2+ and symmetric throughout.  Downgoing Babinski's bilaterally.  No clonus or spasticity.  L-Spine:  Full ROM with pain on Extension. +facet loading on the left.  Negative SLR bilaterally.    SI " Joint/Hip: Negative NYA and FADIR on right but positive on the left with reproduction of pain  Minimal TTP over right hip.  No TTP over lumbar paraspinals, bilateral SI joints, left hip. TTP over the left GTB and piriformis      Imaging:      MRI of her HPI brought at initial visit.  Brief review of the images at that visit revealed no gross abnormality and no fracture.    MRI Lumbar Spine:    Findings:    There is grade 1 anterolisthesis of L3 on L4 grade 1 anterolisthesis of L4 on L5.  There is marrow edema along the appearance of L3 and superior endplate of L4.  There is minimal edema in the superior endplate of L5.  The conus terminates at the level of L1/L2 disc space.    Again is noted degenerative disease lower thoracic spine.  Disc space narrowing and broad-based disc bulge at T11/T12 is not changed.    T12/L1: There is moderate disc space narrowing with a small broad-based disc bulge.    L1/2: There is moderate disc space narrowing.  There is no disc bulge.  There is moderate bilateral facet hypertrophy.  There is no significant canal or foraminal narrowing.    L2/3: There is moderate disc space narrowing.  There is a broad-based disc bulge.  There is facet hypertrophy.  There is mild narrowing of the bilateral neural foramen.    L3/4: There is severe disc space narrowing.  There is severe facet arthropathy.  There is a severe broad-based disc bulge with superimposed disc extrusion with minimal superior migration.  There is severe narrowing of the central canal to a residual diameter of 3 m in greatest AP dimension.  There is severe bilateral neural foraminal narrowing and narrowing of the bilateral lateral recess.  This compression of cauda equina with redundancy of the nerve roots.  This level.    L4/5: There is severe disc space narrowing.  There is severe facet arthropathy.  There is a large broad-based disc bulge greater on the right.  There is severe narrowing of the right neural foramen.  There is  moderate to severe narrowing of the left neural foramen.  There is severe narrowing of the central canal to residual 4 mm in greatest diameter.    L5/S1: There is disc space narrowing with a broad-based disc bulge.  There is moderate narrowing of bilateral neural foramen.  There is moderate narrowing of the central canal to residual 7 mm in AP diameter.    Impression:  1. Broad-based disc bulge at L3/4 with superimposed disc extrusion resulting in severe narrowing of the central canal and bilateral neural foramen as well as severe narrowing of bilateral lateral process.  There is impingement of the descending cauda equina.  This slightly worse compared to the prior exam.  2. Broad-based disc bulge L4/5 with severe narrowing of the central canal and severe right and moderate severe left neural foraminal narrowing.  This is also slightly worsened from prior exam.      Assessment:     Encounter Diagnoses   Name Primary?    DDD (degenerative disc disease), lumbar Yes    Lumbar stenosis     Lumbar facet arthropathy     Pain of left hip joint        Plan:     Laura was seen today for low-back pain.    Diagnoses and all orders for this visit:    DDD (degenerative disc disease), lumbar    Lumbar stenosis    Lumbar facet arthropathy    Pain of left hip joint         Her pain is consistent with the above.    We discussed the assessment and recommendations.  All available images were reviewed. We discussed the disease process, prognosis, treatment plan, and risks and benefits. The patient is aware of the risks and benefits of the medications being prescribed, common side effects, and proper usage. The following is the plan we agreed on:     1. Continue with healthy back program. May consider FRP in the future if indicated  2. Continue with gabapentin 300mg TID  3. Hesitant to perform additional steroid injections at this time as patient had severe HTN immediately after past steroid injection  4. Scheduled for left L2,  L3, L4 medial branch and L5 primary dorsal ramus blocks as patient does have severe facet athropathy and some of her pain seems facetogenic in nature. Informed consent obtained today.  · If good relief proceed with RFA  · If limited relief consider articular branch of the femoral and obturator blocks of the left hip as the left hip also seems to be contributing to her pain.  5.  RTC 3-6 weeks after the above procedures for f/u    Marcos Cárdenas MD  [employer] [facility] Fellow      Greater than 25 minutes spent in total in todays visit with the patient, with more than half that time direct face to face counseling and education with the patient today. We discussed the disease process, prognosis, treatment plan, and risks and benefits.    I have seen the patient with the fellow physician.  I have performed my own history and physical exam and we have come up with the above plan.  The patient is in agreement with our plan.    The above plan and management options were discussed at length with patient. Patient is in agreement with the above and verbalized understanding. It will be communicated with the referring physician via electronic record, fax, or mail.    Ortho/SPM Exam

## 2017-05-30 ENCOUNTER — TELEPHONE (OUTPATIENT)
Dept: PAIN MEDICINE | Facility: CLINIC | Age: 77
End: 2017-05-30

## 2017-05-30 NOTE — TELEPHONE ENCOUNTER
----- Message from Lavonne Armstrong sent at 5/30/2017  8:14 AM CDT -----  Pt. Will call to schedule procedure with Dr. Welch when she's ready.

## 2017-05-31 ENCOUNTER — CLINICAL SUPPORT (OUTPATIENT)
Dept: REHABILITATION | Facility: OTHER | Age: 77
End: 2017-05-31
Attending: PHYSICAL MEDICINE & REHABILITATION
Payer: MEDICARE

## 2017-05-31 DIAGNOSIS — M51.36 DDD (DEGENERATIVE DISC DISEASE), LUMBAR: Primary | ICD-10-CM

## 2017-05-31 NOTE — PROGRESS NOTES
[facility]      Name: Laura Curry  Clinic Number: [id number]  Date of Treatment: 05/31/2017   Diagnosis:   No diagnosis found.  Physician: Aparna Welch MD    Pain pattern determined: pattern 3 updated to 4 on 5/15/17 with intermittent leg pain  Plan of care signed: 5/1/17  POC due 8/1/17    Time in: 8:20  Time Out: 9:30  Total Treatment time: none  Precautions: TKR, HTN, dizziness, fall     Visit #: 6    Evaluation: 5/1/17  Assessment due: 6/1/17  Face to Face discussion of patient was done between PT and PTA.       Subjective       Laura came to the [facility] clinic today, using her rollator walker. She noted  having difficulty this am with her bus ride to the clinic.  Appt time changed to accommodate. Pt appeared visibly upset prior to start of visit due to difficulty with ride.  Upon entering clinic with rollator and lying down with 2 pillows, pt sat up immediately stating she felt very dizzy.  BP was 187/89, O2 saturation was 99%.  Pt felt dizzy and nauseated for 45+ minutes. At times she had difficulty keeping her eyes open but did not lose consciousness, appeared fatigued and sleepy.  Decision to take pt to ED after she stood up and tried to walk with rollator to waiting room.  She lost her balance, became dizzy and nauseated.  She sat back down in WC which was following her. Dr Rogers, [facility] Medical Director and Leann Ann, PT Clinic Director both consulted and  pt was then taken to ER.  She was concerned again about her ride but they were informed she would call them when cleared from ER.  Pt to ER by  with care provided.  NO PT treatment given or charges dropped for today.       Assessment       Pt will continue to benefit from skilled outpatient physical therapy to address the deficits stated in the impairment chart, provide pt/family education and to maximize pt's level of independence in the home and community environment. Pt's spiritual, cultural and  educational needs considered and pt agreeable to plan of care and goals as stated below:     Medical necessity is demonstrated by the following IMPAIRMENTS/PROBLEMS: decreased ROM, weakness LE, lower back pain      GOALS: Pt is in agreement with the following goals.      Short term goals: 5 weeks or 10 visits  1.  Pt will demonstrate increased lumbar ROM as measured by med ex by at least 3 degrees from the initial ROM value with improvements noted in functional ROM and ability to perform ADL  2.  Pt will demonstrate increased maximum isometric torque value by 5% when compared to the initial value resulting in improved ability to function, stand, walk, lift items.  3.  Pt will tolerate regular 5% increases in dynamic weight loads on all machines  4.  Patient report a reduction in worst pain score by 1-2 points for improved tolerance during work and recreational activities  5.  Pt able to perform HEP correctly with minimal cueing or supervision for therapist  6. Pt will demonstrate improvement in flexion/extension strength ratio compared in initial value    Long term goals: 10 weeks or 20 visits  1. Pt will demonstrate increased lumbar ROM by at least 6 degrees from initial ROM value, resulting in improved ability to perform functional fwd bending while standing and sitting.    2. Pt will demonstrate increased maximum isometric torque value by 10% when compared to the initial value, resulting in improved ability to perform bending, lifting, and carrying activities safely, confidently, and 4/10 pain or less.   3. Pt will be able to ambulate community distances safely, confidently, and 4/10 pain or less.  Return to community center  4. Pt to demonstrate ability to independently control and reduce their pain through posture positioning and mechanical movements throughout day.  Go out with friends or go to store  5. Pt able to sleep through the night without waking with c/o pain. Sleep in bed not chair  6. Pt able to  perform household cooking/cleaning ADLS safely, confidently, and 4/10 pain or less.  7. Pt to be able to perform self care and grooming ADLs safely, confidently, independently, and4/10 pain or less.   8. Pt will be able to ascend/descend 1 flight of stairs reciprocally with use of unilateral handrail for safety, confidently and 2/10 pain or less.        OUTCOMES:  Initial FOTO:  78 % limited CL  FOTO Goal: 40-60 % limited CK  Midpoint FOTO: 61% limited CL   FOTO Goal: 40-60 % limited CK  Plan   Continue with established Plan of Care towards established PT goals. Review HEP next visit

## 2017-06-09 ENCOUNTER — CLINICAL SUPPORT (OUTPATIENT)
Dept: REHABILITATION | Facility: OTHER | Age: 77
End: 2017-06-09
Attending: PHYSICAL MEDICINE & REHABILITATION
Payer: MEDICARE

## 2017-06-09 DIAGNOSIS — M51.36 DDD (DEGENERATIVE DISC DISEASE), LUMBAR: Primary | ICD-10-CM

## 2017-06-09 PROCEDURE — 97110 THERAPEUTIC EXERCISES: CPT

## 2017-06-09 NOTE — PROGRESS NOTES
[facility]      Name: Laura Curry  Clinic Number: [id number]  Date of Treatment: 2017   Diagnosis:   Encounter Diagnosis   Name Primary?    DDD (degenerative disc disease), lumbar Yes     Physician: Aparna Welch MD    Pain pattern determined: pattern 3 updated to 4 on 5/15/17 with intermittent leg pain  Plan of care signed: 17  POC due 17    Time in: 9:30  Time Out: 10:30  Total Treatment time: 50  Precautions: TKR, HTN, dizziness, fall     Visit #: 6, GET FOTO NEXT SESSION    Evaluation: 17  Assessment due: 17  Face to Face discussion of patient was done between PT and PTA.       Subjective   Laura reports feeling better today from being dizzy last session and needing to go to the ER. From ER notes pt seemed to be dizzy due to verdigo. Pt c/o 8/10 LBP today and states she does her sitting trunk flexion everyday.     Patient reports their pain to be 8/10 on a 0-10 scale with 0 being no pain and 10 being the worst pain imaginable.  Pain Location: lower back and legs     Work and leisure: retired  Pt goals: decrease pain, go to senior [employer]    Objective       Baseline IM Testing Results:   Date of testin17  ROM 12-30 deg increased to 12-42 with ease visit 3   Max Peak Torque 69 ft lbs    Min Peak Torque 52 ft lbs    Flex/Ext Ratio 1.3/1   % below normative data 52% below       amb with rolator walker to therapy and in gym  Dizziness when she first got up from mat which passed with sitting  Bike tolerated without dizziness, but difficult on knees, reassess on treadmill possibly next visit      Treatment         Pt was instructed in and performed the following:  Cardiovascular exercise and therapeutic exercise to improve posture, lumbar spine and supporting musculature ROM, strength, and muscular endurance as follows:  [facility] 2017   Visit Number 6   VAS Pain Rating 8   Treadmill Time (in min.) -   Speed -   Recumbent Bike  Seat Pos. 5   Time 10   Flexion in Lying -   Flexion in Sitting 10   Lumbar Weight 43   Repetitions 20   Rating of Perceived Exertion 3   Ice - Sitting 10     Supine single knee to chest  Supine lower trunk rotation  Seated lumbar flexion stretches 3 sec hold x10  Seated scapular retractions 10x    Peripheral muscle strengthening which included 1 set of 15-20 repetitions at a slow, controlled 7 second per rep pace focused on strengthening supporting musculature for improved body mechanics and functional mobility.  Pt and therapist focused on proper form during treatment to ensure optimal strengthening of each targeted muscle group.  Machines were utilized including  leg extension, leg curl, chest press, rowing       Written Home Exercises Provided:   Handouts were given to the patient. Pt demo fair understanding of the education provided. Laura demonstrated fair return demonstration of activities.   Single knee to chest on sofa  Double knee to chest on sofa  Lower trunk rotation  Lumbar roll use compliance: not yet  Additional exercises taught this treatment session:   Seated lumbar flexion stretches 3 sec hold x10, 3x daily  Seated scapular retractions 10x, 3x daily     Assessment      Pt c/o dizziness with session today.  Pt able to perform up to 5 minutes on bike with no assist to maintain feet within paddle. Pt did c/o right knee pain that did not worsen throughout treatment session. Her LBP did decrease with session. No supine stretch today due to gets her dizzy. She performed seated trunk flexion due to creates no dizziness.  Pt may benefit from teaching more seated exercises to improve confidence and compliance with regular exercise.  Pt did well on Lumbar Med X but needed multiple verbal cues for timing, pace and speed. Pt requires mod-max supervision for safety when transitioning between machines. No LOB during and post session.  Pt will continue to benefit from skilled outpatient physical therapy to  address the deficits stated in the impairment chart, provide pt/family education and to maximize pt's level of independence in the home and community environment. Pt's spiritual, cultural and educational needs considered and pt agreeable to plan of care and goals as stated below:     Medical necessity is demonstrated by the following IMPAIRMENTS/PROBLEMS: decreased ROM, weakness LE, lower back pain      GOALS: Pt is in agreement with the following goals.      Short term goals: 5 weeks or 10 visits  1.  Pt will demonstrate increased lumbar ROM as measured by med ex by at least 3 degrees from the initial ROM value with improvements noted in functional ROM and ability to perform ADL  2.  Pt will demonstrate increased maximum isometric torque value by 5% when compared to the initial value resulting in improved ability to function, stand, walk, lift items.  3.  Pt will tolerate regular 5% increases in dynamic weight loads on all machines  4.  Patient report a reduction in worst pain score by 1-2 points for improved tolerance during work and recreational activities  5.  Pt able to perform HEP correctly with minimal cueing or supervision for therapist  6. Pt will demonstrate improvement in flexion/extension strength ratio compared in initial value    Long term goals: 10 weeks or 20 visits  1. Pt will demonstrate increased lumbar ROM by at least 6 degrees from initial ROM value, resulting in improved ability to perform functional fwd bending while standing and sitting.    2. Pt will demonstrate increased maximum isometric torque value by 10% when compared to the initial value, resulting in improved ability to perform bending, lifting, and carrying activities safely, confidently, and 4/10 pain or less.   3. Pt will be able to ambulate community distances safely, confidently, and 4/10 pain or less.  Return to community center  4. Pt to demonstrate ability to independently control and reduce their pain through posture  positioning and mechanical movements throughout day.  Go out with friends or go to store  5. Pt able to sleep through the night without waking with c/o pain. Sleep in bed not chair  6. Pt able to perform household cooking/cleaning ADLS safely, confidently, and 4/10 pain or less.  7. Pt to be able to perform self care and grooming ADLs safely, confidently, independently, and4/10 pain or less.   8. Pt will be able to ascend/descend 1 flight of stairs reciprocally with use of unilateral handrail for safety, confidently and 2/10 pain or less.        OUTCOMES:  Initial FOTO:  78 % limited CL  FOTO Goal: 40-60 % limited CK  Midpoint FOTO: 61% limited CL   FOTO Goal: 40-60 % limited CK  Plan   Continue with established Plan of Care towards established PT goals.  Pt may benefit from learning more seated exercises to improve compliance and confidence.

## 2017-06-12 ENCOUNTER — CLINICAL SUPPORT (OUTPATIENT)
Dept: REHABILITATION | Facility: OTHER | Age: 77
End: 2017-06-12
Attending: PHYSICAL MEDICINE & REHABILITATION
Payer: MEDICARE

## 2017-06-12 DIAGNOSIS — M51.36 DDD (DEGENERATIVE DISC DISEASE), LUMBAR: Primary | ICD-10-CM

## 2017-06-12 PROCEDURE — 97110 THERAPEUTIC EXERCISES: CPT

## 2017-06-19 ENCOUNTER — CLINICAL SUPPORT (OUTPATIENT)
Dept: REHABILITATION | Facility: OTHER | Age: 77
End: 2017-06-19
Attending: PHYSICAL MEDICINE & REHABILITATION
Payer: MEDICARE

## 2017-06-19 DIAGNOSIS — M51.36 DDD (DEGENERATIVE DISC DISEASE), LUMBAR: Primary | ICD-10-CM

## 2017-06-19 PROCEDURE — 97110 THERAPEUTIC EXERCISES: CPT

## 2017-06-19 PROCEDURE — 97530 THERAPEUTIC ACTIVITIES: CPT

## 2017-06-19 NOTE — PROGRESS NOTES
[facility]      Name: Laura Curry  Clinic Number: [id number]  Date of Treatment: 2017   Diagnosis:   Encounter Diagnosis   Name Primary?    DDD (degenerative disc disease), lumbar Yes     Physician: Aparna Welch MD    Pain pattern determined: pattern 3 updated to 4 on 5/15/17 with intermittent leg pain  Plan of care signed: 17  POC due 17    Time in: 7:55  Time Out: 8:10  Total Treatment time: 55  Precautions: TKR, HTN, dizziness, fall     Visit #: 9     Evaluation: 17  Assessment due: 17, done 17  Next reassessment due: 17    Subjective   Laura reports she fell  and still has some R side soreness from her fall.  Discussed safety at home to decrease fall and also recommended to have family or some type of aid to come to the house and help with cleaning and supervision of her for safety. She is thinking about it. Pt is performing some HEP exercises. She finds seated lumbar flexion most helpful.     Patient reports their pain to be 7/10 on a 0-10 scale with 0 being no pain and 10 being the worst pain imaginable.  Pain Location: lower back and legs     Work and leisure: retired  Pt goals: decrease pain, go to senior [employer]    Objective       Baseline IM Testing Results:   Date of testin17  ROM 12-30 deg increased to 12-42 with ease visit 3   Max Peak Torque 69 ft lbs    Min Peak Torque 52 ft lbs    Flex/Ext Ratio 1.3/1   % below normative data 52% below       Treatment         Pt was instructed in and performed the following:  Cardiovascular exercise and therapeutic exercise to improve posture, lumbar spine and supporting musculature ROM, strength, and muscular endurance as follows:      [facility] 2017   Visit Number 8   VAS Pain Rating 7   Treadmill Time (in min.) -   Speed -   Recumbent Bike Seat Pos. 5   Time 10   Flexion in Lying -   Flexion in Sitting 10   Lumbar Extension Seat Pad -   Femur Restraint -    Top Dead Center -   Counterweight -   Lumbar Flexion -   Lumbar Extension -   Lumbar Peak Torque -   Lumbar Weight 49   Repetitions 20   Rating of Perceived Exertion 3   Ice - Sitting 10       Seated hip flexion with single knee to chest 10x B  Seated trunk rotation 10x  Seated lumbar flexion stretches 3 sec hold x10  Seated scapular retractions 10x  Sit<>stand 8x (Pt able to perform with SBA and moderate v/c for technique and safety)       Peripheral muscle strengthening which included 1 set of 15-20 repetitions at a slow, controlled 7 second per rep pace focused on strengthening supporting musculature for improved body mechanics and functional mobility.  Pt and therapist focused on proper form during treatment to ensure optimal strengthening of each targeted muscle group.  Machines were utilized including  leg extension, leg curl, chest press, rowing       Written Home Exercises Provided:   Handouts were given to the patient. Pt demo fair understanding of the education provided. Laura demonstrated fair return demonstration of activities.   Single knee to chest on sofa  Double knee to chest on sofa  Lower trunk rotation  Seated lumbar flexion stretches 3 sec hold x10, 3x daily  Seated scapular retractions 10x, 3x daily     Lumbar roll use compliance: not yet  Additional exercises taught this treatment session:       Assessment     Pt performed seated therex FIS, torso rotation, LAQ, AP's and marching x 15 reps. Added seated heel<>toe raises with good pt response. Also sit<>stand x8 trials with light use of B UE to increase balance. Pt is improving in ability to perform with less assistance and v/c.  Plan to continue to add balance and perform gait training activity as appropriate next session.  Pt reports increased dizziness with supine activity so exercises deferred to seated with no reports of dizziness throughout session. Pt did well on Lumbar Med X but needed mild verbal cues for timing, pace and speed. Pt  requires mod-max supervision for safety when transitioning between machines. No LOB during and post session.  Pt will continue to benefit from skilled outpatient physical therapy to address the deficits stated in the impairment chart, provide pt/family education and to maximize pt's level of independence in the home and community environment. Pt's spiritual, cultural and educational needs considered and pt agreeable to plan of care and goals as stated below:     Medical necessity is demonstrated by the following IMPAIRMENTS/PROBLEMS: decreased ROM, weakness LE, lower back pain      GOALS: Pt is in agreement with the following goals.      Short term goals: 5 weeks or 10 visits  1.  Pt will demonstrate increased lumbar ROM as measured by med ex by at least 3 degrees from the initial ROM value with improvements noted in functional ROM and ability to perform ADL  2.  Pt will demonstrate increased maximum isometric torque value by 5% when compared to the initial value resulting in improved ability to function, stand, walk, lift items.  3.  Pt will tolerate regular 5% increases in dynamic weight loads on all machines  4.  Patient report a reduction in worst pain score by 1-2 points for improved tolerance during work and recreational activities  5.  Pt able to perform HEP correctly with minimal cueing or supervision for therapist  6. Pt will demonstrate improvement in flexion/extension strength ratio compared in initial value    Long term goals: 10 weeks or 20 visits  1. Pt will demonstrate increased lumbar ROM by at least 6 degrees from initial ROM value, resulting in improved ability to perform functional fwd bending while standing and sitting.    2. Pt will demonstrate increased maximum isometric torque value by 10% when compared to the initial value, resulting in improved ability to perform bending, lifting, and carrying activities safely, confidently, and 4/10 pain or less.   3. Pt will be able to ambulate community  distances safely, confidently, and 4/10 pain or less.  Return to community [employer]  4. Pt to demonstrate ability to independently control and reduce their pain through posture positioning and mechanical movements throughout day.  Go out with friends or go to store  5. Pt able to sleep through the night without waking with c/o pain. Sleep in bed not chair  6. Pt able to perform household cooking/cleaning ADLS safely, confidently, and 4/10 pain or less.  7. Pt to be able to perform self care and grooming ADLs safely, confidently, independently, and4/10 pain or less.   8. Pt will be able to ascend/descend 1 flight of stairs reciprocally with use of unilateral handrail for safety, confidently and 2/10 pain or less.        OUTCOMES:  Initial FOTO:  78 % limited CL  FOTO Goal: 40-60 % limited CK  Midpoint FOTO: 61% limited CL   FOTO Goal: 40-60 % limited CK  Plan   Continue with established Plan of Care towards established PT goals.  Plan to continue to add balance and perform gait training activity as appropriate next session. Test next session.

## 2017-06-25 NOTE — PROGRESS NOTES
[facility]      Name: Laura Curry  Clinic Number: [id number]  Date of Treatment: 2017   Diagnosis:   Encounter Diagnosis   Name Primary?    DDD (degenerative disc disease), lumbar Yes     Physician: Aparna Welch MD    Pain pattern determined: pattern 3 updated to 4 on 5/15/17 with intermittent leg pain  Plan of care signed: 17  POC due 17    Time in: 7:55  Time Out: 8:20 am  Total Treatment time: 60  Precautions: TKR, HTN, dizziness, fall     Visit #: 10 IM RETEST    Evaluation: 17  Assessment due: 17, done 17  Next reassessment due: 17 done 17  Next reassessment due: 17    Subjective   Laura reports she fell  and still has some R side soreness from her fall.  Discussed safety at home to decrease fall and also recommended to have family or some type of aid to come to the house and help with cleaning and supervision of her for safety. She is thinking about it. Gave her safety handouts for fall precautions and tips for home safety.   Pt is performing some HEP exercises. She finds seated lumbar flexion most helpful. Over all she notes that her ability to walk to and from the clinic has improved with less fatigue and less pain.  She feels her stamina at home is improving.  She still reports intermittent leg and back pain, but reports slightly less pain and notes improved ability getting in and out of bed at home.  She reported 4/10 back and leg pain pre visit and 2/10 post visit, no increase in pain with testing.    Patient reports their pain to be 4/10 on a 0-10 scale with 0 being no pain and 10 being the worst pain imaginable.  Pain Location: lower back and legs     Work and leisure: retired  Pt goals: decrease pain, go to senior [employer]    Objective       Baseline IM Testing Results:   Date of testin17  ROM 12-30 deg increased to 12-42 with ease visit 3   Max Peak Torque 69 ft lbs    Min Peak Torque 52 ft lbs     Flex/Ext Ratio 1.3/1   % below normative data 52% below         Midpoint  IM Testing Results:   Date of testin17  ROM 6-48   Max Peak Torque 110 ft lbs    Min Peak Torque 41 ft lbs    Flex/Ext Ratio 2.2/1   % below normative data 26% below     Average gain in strength 56% gain    Functional strength:  Sit to stand-need for hands  Stand to sit without hands  On and off mat, modified indep  On and off machines, modified indep  On and off bike, min assist for leg  amb without walker in gym with supervision    ROM back:  Flexion min loss  Ext mod loss    Treatment         Pt was instructed in and performed the following:  Cardiovascular exercise and therapeutic exercise to improve posture, lumbar spine and supporting musculature ROM, strength, and muscular endurance as follows:        Seated hip flexion with single knee to chest 10x B  Seated trunk rotation 10x  Seated lumbar flexion stretches 3 sec hold x10  Seated scapular retractions 10x  Sit<>stand 8x (Pt able to perform with SBA and moderate v/c for technique and safety)   Balance work:  amb  Without aid laterally, 5 sets mat length, with supervision  Stand modified tandem eyes open 5 sets bilat, by mat, supervision as needed    [facility] 2017   Visit Number 10   VAS Pain Rating 4   Treadmill Time (in min.) -   Speed -   Recumbent Bike Seat Pos. 5   Time 5   Flexion in Lying -   Flexion in Sitting 10   Lumbar Extension Seat Pad -   Femur Restraint -   Top Dead Center -   Counterweight -   Lumbar Flexion 48   Lumbar Extension 6   Lumbar Peak Torque 110   Min Torque 41   Percent From Norm 26   Percent Change from Initial 56   Lumbar Weight -   Repetitions -   Rating of Perceived Exertion -   Ice - Sitting 10       Peripheral muscle strengthening which included 1 set of 15-20 repetitions at a slow, controlled 7 second per rep pace focused on strengthening supporting musculature for improved body mechanics and functional mobility.  Pt and  therapist focused on proper form during treatment to ensure optimal strengthening of each targeted muscle group.  Machines were utilized including  leg extension, leg curl, chest press, rowing       Written Home Exercises Provided:   Handouts were given to the patient. Pt demo fair understanding of the education provided. Laura demonstrated fair return demonstration of activities.   Single knee to chest on sofa  Double knee to chest on sofa  Lower trunk rotation  Seated lumbar flexion stretches 3 sec hold x10, 3x daily  Seated scapular retractions 10x, 3x daily     Lumbar roll use compliance: not yet  Additional exercises taught this treatment session:  Balance work added to therapy here, safety and fall precaution handouts given      Assessment     Patient has attended 10 visits at [facility] which included MD evaluation, PT evaluation with isometric testing, and physical therapy treatment including HEP instruction, education, aerobic work, dynamic strengthening on med ex equipment for the spine, and whole body strengthening on med ex equipment with increasing weight loads.  Patient  is demonstrating slightly  increased ability to function, sit to stand, amb without aid, stand longer, walk further, slightly reduced back pain with strengthening and flexion strategies, slightly  improved posture, improved lumbar    ROM, and improved lumbar   strength on med ex test.  Patient attended with mod back pain which reduced slightly during visit, no worse with testing, and slightly reduced after z lie and ice after visit.  Continued  seated therex, balance work, functional training work.  Pt is improving in ability to perform with less assistance and v/c.  Plan to continue to continue and add balance and perform gait training activity as appropriate.  Pt has reported  increased dizziness with supine activity so exercises performed in functional positions of sitting, standing with no reports of dizziness  throughout session. Pt will continue to benefit from skilled outpatient physical therapy to address the deficits stated in the impairment chart, provide pt/family education and to maximize pt's level of independence in the home and community environment. Pt's spiritual, cultural and educational needs considered and pt agreeable to plan of care and goals as stated below:     Medical necessity is demonstrated by the following IMPAIRMENTS/PROBLEMS: decreased ROM, weakness LE, lower back pain      GOALS: Pt is in agreement with the following goals.      Short term goals: 5 weeks or 10 visits  1.  Pt will demonstrate increased lumbar ROM as measured by med ex by at least 3 degrees from the initial ROM value with improvements noted in functional ROM and ability to perform ADL    MET WITH IMPROVED ABILITY TO DRESS 6/26/17  2.  Pt will demonstrate increased maximum isometric torque value by 5% when compared to the initial value resulting in improved ability to function, stand, walk, lift items.   MET WITH IMPROVED ABILITY TO CARRY LAUNDRY 6/26/17  3.  Pt will tolerate regular 5% increases in dynamic weight loads on all machines   MET  4.  Patient report a reduction in worst pain score by 1-2 points for improved tolerance during work and recreational activities   MET  5.  Pt able to perform HEP correctly with minimal cueing or supervision for therapist  ONGOING  6. Pt will demonstrate improvement in flexion/extension strength ratio compared in initial value   MET    Long term goals: 10 weeks or 20 visits  1. Pt will demonstrate increased lumbar ROM by at least 6 degrees from initial ROM value, resulting in improved ability to perform functional fwd bending while standing and sitting.    2. Pt will demonstrate increased maximum isometric torque value by 10% when compared to the initial value, resulting in improved ability to perform bending, lifting, and carrying activities safely, confidently, and 4/10 pain or less.   3. Pt  will be able to ambulate community distances safely, confidently, and 4/10 pain or less.  Return to community center  4. Pt to demonstrate ability to independently control and reduce their pain through posture positioning and mechanical movements throughout day.  Go out with friends or go to store  5. Pt able to sleep through the night without waking with c/o pain. Sleep in bed not chair  6. Pt able to perform household cooking/cleaning ADLS safely, confidently, and 4/10 pain or less.  7. Pt to be able to perform self care and grooming ADLs safely, confidently, independently, and4/10 pain or less.   8. Pt will be able to ascend/descend 1 flight of stairs reciprocally with use of unilateral handrail for safety, confidently and 2/10 pain or less.        OUTCOMES:  Initial FOTO:  78 % limited CL  FOTO Goal: 40-60 % limited CK  Midpoint FOTO: 61% limited CL   FOTO Goal: 40-60 % limited CK  FOTO 65% at midpoint, visit 10, with assist needed for balance, sit to stand, fall precautions, mobility        Plan   Continue with established Plan of Care towards established PT goals.  Plan to continue to add balance and perform gait training activity as appropriate next session.

## 2017-06-26 ENCOUNTER — CLINICAL SUPPORT (OUTPATIENT)
Dept: REHABILITATION | Facility: OTHER | Age: 77
End: 2017-06-26
Attending: PHYSICAL MEDICINE & REHABILITATION
Payer: MEDICARE

## 2017-06-26 DIAGNOSIS — M51.36 DDD (DEGENERATIVE DISC DISEASE), LUMBAR: Primary | ICD-10-CM

## 2017-06-26 PROCEDURE — 97110 THERAPEUTIC EXERCISES: CPT | Performed by: PHYSICAL MEDICINE & REHABILITATION

## 2017-06-26 PROCEDURE — G8978 MOBILITY CURRENT STATUS: HCPCS | Mod: CL | Performed by: PHYSICAL MEDICINE & REHABILITATION

## 2017-06-26 PROCEDURE — G8979 MOBILITY GOAL STATUS: HCPCS | Mod: CL | Performed by: PHYSICAL MEDICINE & REHABILITATION

## 2017-06-26 PROCEDURE — 97750 PHYSICAL PERFORMANCE TEST: CPT | Performed by: PHYSICAL MEDICINE & REHABILITATION

## 2017-06-30 ENCOUNTER — CLINICAL SUPPORT (OUTPATIENT)
Dept: REHABILITATION | Facility: OTHER | Age: 77
End: 2017-06-30
Attending: PHYSICAL MEDICINE & REHABILITATION
Payer: MEDICARE

## 2017-06-30 DIAGNOSIS — M51.36 DDD (DEGENERATIVE DISC DISEASE), LUMBAR: Primary | ICD-10-CM

## 2017-06-30 PROCEDURE — 97110 THERAPEUTIC EXERCISES: CPT | Performed by: PHYSICAL MEDICINE & REHABILITATION

## 2017-06-30 NOTE — PROGRESS NOTES
[facility]      Name: Laura Curry  Clinic Number: [id number]  Date of Treatment: 2017   Diagnosis:   Encounter Diagnosis   Name Primary?    DDD (degenerative disc disease), lumbar Yes     Physician: Aparna Welch MD    Pain pattern determined: pattern 3 updated to 4 on 5/15/17 with intermittent leg pain  Plan of care signed: 17  POC due 17    Time in: 7:45   Time Out: 8:45 am  Total Treatment time: 60  Precautions: TKR, HTN, dizziness, fall     Visit #: 11    Evaluation: 17  Assessment due: 17, done 17  Next reassessment due: 17 done 17  Next reassessment due: 17    Subjective   Laura reports she is performing some HEP exercises. She finds seated lumbar flexion most helpful. Over all she notes that her ability to walk to and from the clinic has improved with less fatigue and less pain.  She feels her stamina at home is improving.  She still reports intermittent leg and back pain, but reports slightly less pain and notes improved ability getting in and out of bed at home.  She reported 2/10 back and leg pain pre visit and 0/10 post visit.    Patient reports their pain to be 2/10 on a 0-10 scale with 0 being no pain and 10 being the worst pain imaginable.  Pain Location: lower back and legs     Work and leisure: retired  Pt goals: decrease pain, go to senior center    Objective       Baseline IM Testing Results:   Date of testin17  ROM 12-30 deg increased to 12-42 with ease visit 3   Max Peak Torque 69 ft lbs    Min Peak Torque 52 ft lbs    Flex/Ext Ratio 1.3/1   % below normative data 52% below         Midpoint  IM Testing Results:   Date of testin17  ROM 6-48   Max Peak Torque 110 ft lbs    Min Peak Torque 41 ft lbs    Flex/Ext Ratio 2.2/1   % below normative data 26% below     Average gain in strength 56% gain    Functional strength: at midpoint   Sit to stand-need for hands  Stand to sit without hands  On and  off mat, modified indep  On and off machines, modified indep  On and off bike, min assist for leg  amb without walker in gym with supervision        Treatment         Pt was instructed in and performed the following:  Cardiovascular exercise and therapeutic exercise to improve posture, lumbar spine and supporting musculature ROM, strength, and muscular endurance as follows:        Seated hip flexion with single knee to chest 10x B  Seated trunk rotation 10x  Seated lumbar flexion stretches 3 sec hold x10  Seated scapular retractions 10x  Sit<>stand 8x (Pt able to perform with SBA and moderate v/c for technique and safety)   Balance work:  amb  Without aid laterally, 5 sets mat length, with supervision  Stand modified tandem eyes open 5 sets bilat, by mat, supervision as needed  Balance work-stepping forward and laterally onto cards on floor    [facility] 6/30/2017   Visit Number 11   VAS Pain Rating 1   Treadmill Time (in min.) -   Speed -   Recumbent Bike Seat Pos. 5   Time 5   Flexion in Lying -   Flexion in Sitting 10   Lumbar Extension Seat Pad -   Femur Restraint -   Top Dead Center -   Counterweight -   Lumbar Flexion -   Lumbar Extension -   Lumbar Peak Torque -   Min Torque -   Percent From Norm -   Percent Change from Initial -   Lumbar Weight 51   Repetitions 15   Rating of Perceived Exertion 4   Ice - Sitting 10       Peripheral muscle strengthening which included 1 set of 15-20 repetitions at a slow, controlled 7 second per rep pace focused on strengthening supporting musculature for improved body mechanics and functional mobility.  Pt and therapist focused on proper form during treatment to ensure optimal strengthening of each targeted muscle group.  Machines were utilized including  leg extension, leg curl, chest press, rowing and triceps      Written Home Exercises Provided:   Handouts were given to the patient. Pt demo fair understanding of the education provided. Laura francisco fair  return demonstration of activities.   Single knee to chest on sofa  Double knee to chest on sofa  Lower trunk rotation  Seated lumbar flexion stretches 3 sec hold x10, 3x daily  Seated scapular retractions 10x, 3x daily     Lumbar roll use compliance: not yet  Additional exercises taught this treatment session:  Balance work added to therapy here, safety and fall precaution handouts given      Assessment     Patient  attended with min back pain which reduced  during visit, no worse with exercise and reduced after z lie and ice after visit.  Continued  seated therex, balance work, functional training work.  Pt is improving in ability to perform with less assistance and v/c.  Plan to continue to continue and add balance and perform gait training activity as appropriate.  Pt has reported  increased dizziness with supine activity so exercises performed in functional positions of sitting, standing with no reports of dizziness throughout session. Pt will continue to benefit from skilled outpatient physical therapy to address the deficits stated in the impairment chart, provide pt/family education and to maximize pt's level of independence in the home and community environment. Pt's spiritual, cultural and educational needs considered and pt agreeable to plan of care and goals as stated below:     Medical necessity is demonstrated by the following IMPAIRMENTS/PROBLEMS: decreased ROM, weakness LE, lower back pain      GOALS: Pt is in agreement with the following goals.      Short term goals: 5 weeks or 10 visits  1.  Pt will demonstrate increased lumbar ROM as measured by med ex by at least 3 degrees from the initial ROM value with improvements noted in functional ROM and ability to perform ADL    MET WITH IMPROVED ABILITY TO DRESS 6/26/17  2.  Pt will demonstrate increased maximum isometric torque value by 5% when compared to the initial value resulting in improved ability to function, stand, walk, lift items.   MET WITH  IMPROVED ABILITY TO CARRY LAUNDRY 6/26/17  3.  Pt will tolerate regular 5% increases in dynamic weight loads on all machines   MET  4.  Patient report a reduction in worst pain score by 1-2 points for improved tolerance during work and recreational activities   MET  5.  Pt able to perform HEP correctly with minimal cueing or supervision for therapist  ONGOING  6. Pt will demonstrate improvement in flexion/extension strength ratio compared in initial value   MET    Long term goals: 10 weeks or 20 visits  1. Pt will demonstrate increased lumbar ROM by at least 6 degrees from initial ROM value, resulting in improved ability to perform functional fwd bending while standing and sitting.    2. Pt will demonstrate increased maximum isometric torque value by 10% when compared to the initial value, resulting in improved ability to perform bending, lifting, and carrying activities safely, confidently, and 4/10 pain or less.   3. Pt will be able to ambulate community distances safely, confidently, and 4/10 pain or less.  Return to community center  4. Pt to demonstrate ability to independently control and reduce their pain through posture positioning and mechanical movements throughout day.  Go out with friends or go to store  5. Pt able to sleep through the night without waking with c/o pain. Sleep in bed not chair  6. Pt able to perform household cooking/cleaning ADLS safely, confidently, and 4/10 pain or less.  7. Pt to be able to perform self care and grooming ADLs safely, confidently, independently, and4/10 pain or less.   8. Pt will be able to ascend/descend 1 flight of stairs reciprocally with use of unilateral handrail for safety, confidently and 2/10 pain or less.        OUTCOMES:  Initial FOTO:  78 % limited CL  FOTO Goal: 40-60 % limited CK  Midpoint FOTO: 61% limited CL   FOTO Goal: 40-60 % limited CK  FOTO 65% at midpoint, visit 10, with assist needed for balance, sit to stand, fall precautions,  mobility        Plan   Continue with established Plan of Care towards established PT goals.  Plan to continue to add balance and perform gait training activity as appropriate next session.

## 2017-07-11 ENCOUNTER — CLINICAL SUPPORT (OUTPATIENT)
Dept: REHABILITATION | Facility: OTHER | Age: 77
End: 2017-07-11
Attending: PHYSICAL MEDICINE & REHABILITATION
Payer: MEDICARE

## 2017-07-11 DIAGNOSIS — M51.36 DDD (DEGENERATIVE DISC DISEASE), LUMBAR: Primary | ICD-10-CM

## 2017-07-11 PROCEDURE — 97110 THERAPEUTIC EXERCISES: CPT

## 2017-07-11 NOTE — PROGRESS NOTES
[facility]      Name: Laura Curry  Clinic Number: [id number]  Date of Treatment: 2017   Diagnosis:   No diagnosis found.  Physician: Aparna Welch MD    Pain pattern determined: pattern 3 updated to 4 on 5/15/17 with intermittent leg pain  Plan of care signed: 17  POC due 17    Time in: 8:00  Time Out: 9:00 am  Total Treatment time: 60  Precautions: TKR, HTN, dizziness, fall     Visit #: 12    Evaluation: 17  Assessment due: 17, done 17  Next reassessment due: 17 done 17  Next reassessment due: 17    Subjective   Laura reports she is performing some HEP exercises. She finds seated lumbar flexion most helpful. Over all she notes that her ability to walk to and from the clinic has improved with less fatigue and less pain.  She feels her stamina at home is improving.  She still reports intermittent leg and back pain, but reports slightly less pain and notes improved ability getting in and out of bed at home.  She reported 8/10 left low back pain and leg pain pre visit and 3-5/10 post visit.    Patient reports their pain to be 8/10 on a 0-10 scale with 0 being no pain and 10 being the worst pain imaginable.  Pain Location: lower back and legs     Work and leisure: retired  Pt goals: decrease pain, go to senior center    Objective       Baseline IM Testing Results:   Date of testin17  ROM 12-30 deg increased to 12-42 with ease visit 3   Max Peak Torque 69 ft lbs    Min Peak Torque 52 ft lbs    Flex/Ext Ratio 1.3/1   % below normative data 52% below         Midpoint  IM Testing Results:   Date of testin17  ROM 6-48   Max Peak Torque 110 ft lbs    Min Peak Torque 41 ft lbs    Flex/Ext Ratio 2.2/1   % below normative data 26% below     Average gain in strength 56% gain    Functional strength: at midpoint   Sit to stand-need for hands  Stand to sit without hands  On and off mat, modified indep  On and off machines,  modified indep  On and off bike, min assist for leg  amb without walker in gym with supervision        Treatment         Pt was instructed in and performed the following:  Cardiovascular exercise and therapeutic exercise to improve posture, lumbar spine and supporting musculature ROM, strength, and muscular endurance as follows:        Seated hip flexion with single knee to chest 10x B  Seated trunk rotation 10x  Seated lumbar flexion stretches 3 sec hold x10  Seated scapular retractions 10x  Sit<>stand 5x (Pt able to perform with SBA and mild v/c for technique and safety)   Attempted trial of standing extension with LE supported 10x (Pt reporting improved low back symptoms with LE supported, mild ERP when performing with rollator)    Balance work with SBA and UE support:  Standing hip extension 10x  Standing hip abduction 10x  Standing knee flexion 10x   [facility] 7/11/2017   Visit Number 12   VAS Pain Rating 8   Treadmill Time (in min.) -   Speed -   Recumbent Bike Seat Pos. (No Data)   Time -   Extension in Standing 10   Flexion in Lying -   Flexion in Sitting 10   Lumbar Extension Seat Pad -   Femur Restraint -   Top Dead Center -   Counterweight -   Lumbar Flexion -   Lumbar Extension -   Lumbar Peak Torque -   Min Torque -   Percent From Norm -   Percent Change from Initial -   Lumbar Weight 51   Repetitions 20   Rating of Perceived Exertion 3   Ice - Sitting (No Data)       Peripheral muscle strengthening which included 1 set of 15-20 repetitions at a slow, controlled 7 second per rep pace focused on strengthening supporting musculature for improved body mechanics and functional mobility.  Pt and therapist focused on proper form during treatment to ensure optimal strengthening of each targeted muscle group.  Machines were utilized including  leg extension, leg curl, chest press, rowing and triceps      Written Home Exercises Provided:   Handouts were given to the patient. Pt demo fair understanding  of the education provided. Laura demonstrated fair return demonstration of activities.   Single knee to chest on sofa  Double knee to chest on sofa  Lower trunk rotation  Seated lumbar flexion stretches 3 sec hold x10, 3x daily  Seated scapular retractions 10x, 3x daily     Lumbar roll use compliance: not yet  Additional exercises taught this treatment session:  Balance work added to therapy here, safety and fall precaution handouts given  Standing extension with LE supported against mat 10x (trial added)       Assessment     Patient  attended with moderate-severe back pain which reduced  during visit, no worse with exercise and reduced after z lie and ice after visit.  Continued  seated therex, balance work, functional training work.  Pt is improving in ability to perform with less assistance and v/c. Attempted trial of standing extension with LE support with pt reporting improved low back symptoms with LE supported, mild ERP when performing with rollator. Review again next session. Plan to continue to continue and add balance and perform gait training activity as appropriate.  Pt has reported  increased dizziness with supine activity so exercises performed in functional positions of sitting, standing with no reports of dizziness throughout session. Pt demonstrates slow transitions between peripheral exercise machines but is able to complete full circuit with improved ease and 1:1 SBA per fall risk. Pt will continue to benefit from skilled outpatient physical therapy to address the deficits stated in the impairment chart, provide pt/family education and to maximize pt's level of independence in the home and community environment. Pt's spiritual, cultural and educational needs considered and pt agreeable to plan of care and goals as stated below:     Medical necessity is demonstrated by the following IMPAIRMENTS/PROBLEMS: decreased ROM, weakness LE, lower back pain      GOALS: Pt is in agreement with the  following goals.      Short term goals: 5 weeks or 10 visits  1.  Pt will demonstrate increased lumbar ROM as measured by med ex by at least 3 degrees from the initial ROM value with improvements noted in functional ROM and ability to perform ADL    MET WITH IMPROVED ABILITY TO DRESS 6/26/17  2.  Pt will demonstrate increased maximum isometric torque value by 5% when compared to the initial value resulting in improved ability to function, stand, walk, lift items.   MET WITH IMPROVED ABILITY TO CARRY LAUNDRY 6/26/17  3.  Pt will tolerate regular 5% increases in dynamic weight loads on all machines   MET  4.  Patient report a reduction in worst pain score by 1-2 points for improved tolerance during work and recreational activities   MET  5.  Pt able to perform HEP correctly with minimal cueing or supervision for therapist  ONGOING  6. Pt will demonstrate improvement in flexion/extension strength ratio compared in initial value   MET    Long term goals: 10 weeks or 20 visits  1. Pt will demonstrate increased lumbar ROM by at least 6 degrees from initial ROM value, resulting in improved ability to perform functional fwd bending while standing and sitting.    2. Pt will demonstrate increased maximum isometric torque value by 10% when compared to the initial value, resulting in improved ability to perform bending, lifting, and carrying activities safely, confidently, and 4/10 pain or less.   3. Pt will be able to ambulate community distances safely, confidently, and 4/10 pain or less.  Return to community center  4. Pt to demonstrate ability to independently control and reduce their pain through posture positioning and mechanical movements throughout day.  Go out with friends or go to store  5. Pt able to sleep through the night without waking with c/o pain. Sleep in bed not chair  6. Pt able to perform household cooking/cleaning ADLS safely, confidently, and 4/10 pain or less.  7. Pt to be able to perform self care and  grooming ADLs safely, confidently, independently, and4/10 pain or less.   8. Pt will be able to ascend/descend 1 flight of stairs reciprocally with use of unilateral handrail for safety, confidently and 2/10 pain or less.        OUTCOMES:  Initial FOTO:  78 % limited CL  FOTO Goal: 40-60 % limited CK  Midpoint FOTO: 61% limited CL   FOTO Goal: 40-60 % limited CK  FOTO 65% at midpoint, visit 10, with assist needed for balance, sit to stand, fall precautions, mobility        Plan   Continue with established Plan of Care towards established PT goals.  Plan to continue to add balance and perform gait training activity as appropriate next session.

## 2017-07-13 ENCOUNTER — CLINICAL SUPPORT (OUTPATIENT)
Dept: REHABILITATION | Facility: OTHER | Age: 77
End: 2017-07-13
Attending: PHYSICAL MEDICINE & REHABILITATION
Payer: MEDICARE

## 2017-07-13 DIAGNOSIS — M51.36 DDD (DEGENERATIVE DISC DISEASE), LUMBAR: Primary | ICD-10-CM

## 2017-07-13 PROCEDURE — 97110 THERAPEUTIC EXERCISES: CPT | Performed by: PHYSICAL THERAPIST

## 2017-07-13 NOTE — PROGRESS NOTES
[facility]      Name: Laura Crury  Clinic Number: [id number]  Date of Treatment: 2017   Diagnosis:   Encounter Diagnosis   Name Primary?    DDD (degenerative disc disease), lumbar Yes     Physician: Aparna Welch MD    Pain pattern determined: pattern 3 updated to 4 on 5/15/17 with intermittent leg pain  Plan of care signed: 17  POC due 17    Time in: 8:00  Time Out: 9:00 am  Total Treatment time: 60  Precautions: TKR, HTN, dizziness, fall     Visit #: 13    Evaluation: 17  Assessment due: 17, done 17  Next reassessment due: 17 done 17  Next reassessment due: 17    Subjective   Laura reports she is performing some HEP exercises. She finds seated lumbar flexion most helpful. Over all she notes that her ability to walk to and from the clinic has improved with less fatigue and less pain.  She feels her stamina at home is improving.  She still reports intermittent leg and back pain, but reports slightly less pain and notes improved ability getting in and out of bed at home.  She reported 5/10  low back pain and leg pain pre visit, improved post visit.  Pt states that her back felt pretty good today but she still has good and bad days.     Patient reports their pain to be 5/10 on a 0-10 scale with 0 being no pain and 10 being the worst pain imaginable.  Pain Location: lower back and legs     Work and leisure: retired  Pt goals: decrease pain, go to senior [employer]    Objective       Baseline IM Testing Results:   Date of testin17  ROM 12-30 deg increased to 12-42 with ease visit 3   Max Peak Torque 69 ft lbs    Min Peak Torque 52 ft lbs    Flex/Ext Ratio 1.3/1   % below normative data 52% below         Midpoint  IM Testing Results:   Date of testin17  ROM 6-48   Max Peak Torque 110 ft lbs    Min Peak Torque 41 ft lbs    Flex/Ext Ratio 2.2/1   % below normative data 26% below     Average gain in strength 56%  gain    Functional strength: at midpoint   Sit to stand-need for hands  Stand to sit without hands  On and off mat, modified indep  On and off machines, modified indep  On and off bike, min assist for leg  amb without walker in gym with supervision        Treatment         Pt was instructed in and performed the following:  Cardiovascular exercise and therapeutic exercise to improve posture, lumbar spine and supporting musculature ROM, strength, and muscular endurance as follows:    [facility] 7/13/2017   Visit Number 13   VAS Pain Rating 5   Recumbent Bike Seat Pos. 0   Time 0   Extension in Standing 10   Flexion in Sitting 10   Lumbar Weight 53   Repetitions 20   Rating of Perceived Exertion 3   Ice - Sitting 10         Seated hip flexion with single knee to chest 10x B  Seated trunk rotation 10x  Seated lumbar flexion stretches 3 sec hold x10  Seated scapular retractions 10x  Sit<>stand 5x (Pt able to perform with SBA and mild v/c for technique and safety)   Attempted trial of standing extension with LE supported 10x (Pt reporting improved low back symptoms with LE supported, mild ERP when performing with rollator)    Balance work with SBA and UE support:  Standing hip extension 10x  Standing hip abduction 10x  Standing knee flexion 10x     Peripheral muscle strengthening which included 1 set of 15-20 repetitions at a slow, controlled 7 second per rep pace focused on strengthening supporting musculature for improved body mechanics and functional mobility.  Pt and therapist focused on proper form during treatment to ensure optimal strengthening of each targeted muscle group.  Machines were utilized including  leg extension, leg curl, chest press, rowing and triceps.  Added biceps with free weights 20 reps 1#'s    Written Home Exercises Provided:   Handouts were given to the patient. Pt demo fair understanding of the education provided. Laura demonstrated fair return demonstration of activities.   Single  knee to chest on sofa  Double knee to chest on sofa  Lower trunk rotation  Seated lumbar flexion stretches 3 sec hold x10, 3x daily  Seated scapular retractions 10x, 3x daily     Lumbar roll use compliance: not yet  Additional exercises taught this treatment session:  Balance work added to therapy here, safety and fall precaution handouts given  Standing extension with LE supported against mat 10x (trial added)       Assessment     Patient  attended with moderate back pain which reduced  during visit, no worse with exercise and reduced after z lie and ice after visit.  Continued  seated therex, balance work, functional training work.  Pt is improving in ability to perform with less assistance and v/c. Attempted trial of standing extension with LE support with pt reporting improved low back symptoms with LE supported, mild ERP when performing with rollator. Review again next session.Continue  balance and gait training activity as appropriate. Pt demonstrates slow transitions between peripheral exercise machines but is able to complete full circuit with improved ease and 1:1 SBA per fall risk. Pt will continue to benefit from skilled outpatient physical therapy to address the deficits stated in the impairment chart, provide pt/family education and to maximize pt's level of independence in the home and community environment. Pt's spiritual, cultural and educational needs considered and pt agreeable to plan of care and goals as stated below:     Medical necessity is demonstrated by the following IMPAIRMENTS/PROBLEMS: decreased ROM, weakness LE, lower back pain      GOALS: Pt is in agreement with the following goals.      Short term goals: 5 weeks or 10 visits  1.  Pt will demonstrate increased lumbar ROM as measured by med ex by at least 3 degrees from the initial ROM value with improvements noted in functional ROM and ability to perform ADL    MET WITH IMPROVED ABILITY TO DRESS 6/26/17  2.  Pt will demonstrate  increased maximum isometric torque value by 5% when compared to the initial value resulting in improved ability to function, stand, walk, lift items.   MET WITH IMPROVED ABILITY TO CARRY LAUNDRY 6/26/17  3.  Pt will tolerate regular 5% increases in dynamic weight loads on all machines   MET  4.  Patient report a reduction in worst pain score by 1-2 points for improved tolerance during work and recreational activities   MET  5.  Pt able to perform HEP correctly with minimal cueing or supervision for therapist  ONGOING  6. Pt will demonstrate improvement in flexion/extension strength ratio compared in initial value   MET    Long term goals: 10 weeks or 20 visits  1. Pt will demonstrate increased lumbar ROM by at least 6 degrees from initial ROM value, resulting in improved ability to perform functional fwd bending while standing and sitting.    2. Pt will demonstrate increased maximum isometric torque value by 10% when compared to the initial value, resulting in improved ability to perform bending, lifting, and carrying activities safely, confidently, and 4/10 pain or less.   3. Pt will be able to ambulate community distances safely, confidently, and 4/10 pain or less.  Return to community center  4. Pt to demonstrate ability to independently control and reduce their pain through posture positioning and mechanical movements throughout day.  Go out with friends or go to store  5. Pt able to sleep through the night without waking with c/o pain. Sleep in bed not chair  6. Pt able to perform household cooking/cleaning ADLS safely, confidently, and 4/10 pain or less.  7. Pt to be able to perform self care and grooming ADLs safely, confidently, independently, and4/10 pain or less.   8. Pt will be able to ascend/descend 1 flight of stairs reciprocally with use of unilateral handrail for safety, confidently and 2/10 pain or less.        OUTCOMES:  Initial FOTO:  78 % limited CL  FOTO Goal: 40-60 % limited CK  Midpoint FOTO:  61% limited CL   FOTO Goal: 40-60 % limited CK  FOTO 65% at midpoint, visit 10, with assist needed for balance, sit to stand, fall precautions, mobility        Plan   Continue with established Plan of Care towards established PT goals.  Plan to continue to add balance and perform gait training activity as appropriate next session.

## 2017-07-17 ENCOUNTER — CLINICAL SUPPORT (OUTPATIENT)
Dept: REHABILITATION | Facility: OTHER | Age: 77
End: 2017-07-17
Attending: PHYSICAL MEDICINE & REHABILITATION
Payer: MEDICARE

## 2017-07-17 DIAGNOSIS — M51.36 DDD (DEGENERATIVE DISC DISEASE), LUMBAR: Primary | ICD-10-CM

## 2017-07-17 PROCEDURE — 97110 THERAPEUTIC EXERCISES: CPT

## 2017-07-17 NOTE — PROGRESS NOTES
[facility]      Name: Laura Curry  Clinic Number: [id number]  Date of Treatment: 2017   Diagnosis:   No diagnosis found.  Physician: Aparna Welch MD    Pain pattern determined: pattern 3 updated to 4 on 5/15/17 with intermittent leg pain  Plan of care signed: 17  POC due 17    Time in: 8:00  Time Out: 9:00 am  Total Treatment time: 60  Precautions: TKR, HTN, dizziness, fall     Visit #: 14    Evaluation: 17  Assessment due: 17, done 17  Next reassessment due: 17 done 17  Next reassessment due: 17    Subjective   Laura reports she is performing some HEP exercises. She notes increased back soreness after performing standing balance exercises last session. Over all she notes that her ability to walk to and from the clinic has improved with less fatigue and less pain.  She feels her stamina at home is improving.  However, she is still concerned with her balance, especially when going sit<>stand. She still reports intermittent leg and back pain, but reports slightly less pain and notes improved ability getting in and out of bed at home.  She reported 4/10  low back pain and leg pain pre visit, improved post visit.      Patient reports their pain to be 4/10 on a 0-10 scale with 0 being no pain and 10 being the worst pain imaginable.    Pain Location: lower back and legs     Work and leisure: retired  Pt goals: decrease pain, go to senior center    Objective       Baseline IM Testing Results:   Date of testin17  ROM 12-30 deg increased to 12-42 with ease visit 3   Max Peak Torque 69 ft lbs    Min Peak Torque 52 ft lbs    Flex/Ext Ratio 1.3/1   % below normative data 52% below         Midpoint  IM Testing Results:   Date of testin17  ROM 6-48   Max Peak Torque 110 ft lbs    Min Peak Torque 41 ft lbs    Flex/Ext Ratio 2.2/1   % below normative data 26% below     Average gain in strength 56% gain    Functional strength:  at midpoint   Sit to stand-need for hands  Stand to sit without hands  On and off mat, modified indep  On and off machines, modified indep  On and off bike, min assist for leg  amb without walker in gym with supervision        Treatment         Pt was instructed in and performed the following:  Cardiovascular exercise and therapeutic exercise to improve posture, lumbar spine and supporting musculature ROM, strength, and muscular endurance as follows:    [facility] 7/17/2017   Visit Number 14   VAS Pain Rating 4   Treadmill Time (in min.) -   Speed -   Recumbent Bike Seat Pos. -   Time -   Extension in Standing 10   Flexion in Lying -   Flexion in Sitting -   Lumbar Extension Seat Pad -   Femur Restraint -   Top Dead Center -   Counterweight -   Lumbar Flexion -   Lumbar Extension -   Lumbar Peak Torque -   Min Torque -   Percent From Norm -   Percent Change from Initial -   Lumbar Weight 56   Repetitions 15   Rating of Perceived Exertion 3   Ice - Sitting (No Data)     Seated hip flexion with single knee to chest 10x B NT  Seated trunk rotation 10x NT  Seated lumbar flexion stretches 3 sec hold x10 NT  Seated scapular retractions 10x NT  Sit<>stand 10x (Pt able to perform with SBA and mild v/c for technique and safety)   Standing extension with LE supported 10x (Pt reporting improved low back symptoms with LE supportedr)    Balance work with SBA and UE support:  Standing ankle PF 10x  Standing hip abduction 10x  Standing knee flexion 10x      Peripheral muscle strengthening which included 1 set of 15-20 repetitions at a slow, controlled 7 second per rep pace focused on strengthening supporting musculature for improved body mechanics and functional mobility.  Pt and therapist focused on proper form during treatment to ensure optimal strengthening of each targeted muscle group.  Machines were utilized including  leg extension, leg curl, chest press, rowing and triceps.  Added biceps with free weights 20 reps  1#'s    Written Home Exercises Provided:   Handouts were given to the patient. Pt demo fair understanding of the education provided. Laura demonstrated fair return demonstration of activities.   Single knee to chest on sofa  Double knee to chest on sofa  Lower trunk rotation  Seated lumbar flexion stretches 3 sec hold x10, 3x daily  Seated scapular retractions 10x, 3x daily   Standing ankle PF 10x  Standing hip abduction 10x  Standing knee flexion 10x      Lumbar roll use compliance: not yet  Additional exercises taught this treatment session:  Balance work added to therapy here, safety and fall precaution handouts given  Standing extension with LE supported against mat 10x (trial added)   Standing ankle PF 10x  Standing hip abduction 10x  Standing knee flexion 10x      Assessment     Patient  attended with moderate back pain which reduced  during visit, no worse with exercise and reduced after z lie and ice after visit.  Continued  seated therex, balance work, functional training work.  Pt is improving in ability to perform with less assistance and v/c. Continue  balance and gait training activity as appropriate. Pt demonstrates slow transitions between peripheral exercise machines but is able to complete full circuit with improved ease and 1:1 SBA per fall risk. Pt will continue to benefit from skilled outpatient physical therapy to address the deficits stated in the impairment chart, provide pt/family education and to maximize pt's level of independence in the home and community environment. Pt's spiritual, cultural and educational needs considered and pt agreeable to plan of care and goals as stated below:     Medical necessity is demonstrated by the following IMPAIRMENTS/PROBLEMS: decreased ROM, weakness LE, lower back pain      GOALS: Pt is in agreement with the following goals.      Short term goals: 5 weeks or 10 visits  1.  Pt will demonstrate increased lumbar ROM as measured by med ex by at least 3  degrees from the initial ROM value with improvements noted in functional ROM and ability to perform ADL    MET WITH IMPROVED ABILITY TO DRESS 6/26/17  2.  Pt will demonstrate increased maximum isometric torque value by 5% when compared to the initial value resulting in improved ability to function, stand, walk, lift items.   MET WITH IMPROVED ABILITY TO CARRY LAUNDRY 6/26/17  3.  Pt will tolerate regular 5% increases in dynamic weight loads on all machines   MET  4.  Patient report a reduction in worst pain score by 1-2 points for improved tolerance during work and recreational activities   MET  5.  Pt able to perform HEP correctly with minimal cueing or supervision for therapist  ONGOING  6. Pt will demonstrate improvement in flexion/extension strength ratio compared in initial value   MET    Long term goals: 10 weeks or 20 visits  1. Pt will demonstrate increased lumbar ROM by at least 6 degrees from initial ROM value, resulting in improved ability to perform functional fwd bending while standing and sitting.    2. Pt will demonstrate increased maximum isometric torque value by 10% when compared to the initial value, resulting in improved ability to perform bending, lifting, and carrying activities safely, confidently, and 4/10 pain or less.   3. Pt will be able to ambulate community distances safely, confidently, and 4/10 pain or less.  Return to community center  4. Pt to demonstrate ability to independently control and reduce their pain through posture positioning and mechanical movements throughout day.  Go out with friends or go to store  5. Pt able to sleep through the night without waking with c/o pain. Sleep in bed not chair  6. Pt able to perform household cooking/cleaning ADLS safely, confidently, and 4/10 pain or less.  7. Pt to be able to perform self care and grooming ADLs safely, confidently, independently, and4/10 pain or less.   8. Pt will be able to ascend/descend 1 flight of stairs reciprocally  with use of unilateral handrail for safety, confidently and 2/10 pain or less.        OUTCOMES:  Initial FOTO:  78 % limited CL  FOTO Goal: 40-60 % limited CK  Midpoint FOTO: 61% limited CL   FOTO Goal: 40-60 % limited CK  FOTO 65% at midpoint, visit 10, with assist needed for balance, sit to stand, fall precautions, mobility        Plan   Continue with established Plan of Care towards established PT goals.  Plan to continue to add balance and perform gait training activity as appropriate next session.  Plan to perform walking circles around clinic for warm up.

## 2017-07-24 ENCOUNTER — CLINICAL SUPPORT (OUTPATIENT)
Dept: REHABILITATION | Facility: OTHER | Age: 77
End: 2017-07-24
Attending: PHYSICAL MEDICINE & REHABILITATION
Payer: MEDICARE

## 2017-07-24 DIAGNOSIS — M51.36 DDD (DEGENERATIVE DISC DISEASE), LUMBAR: Primary | ICD-10-CM

## 2017-07-24 PROCEDURE — 97110 THERAPEUTIC EXERCISES: CPT

## 2017-07-24 NOTE — PROGRESS NOTES
[facility]      Name: Laura Curry  Clinic Number: [id number]  Date of Treatment: 2017   Diagnosis:   Encounter Diagnosis   Name Primary?    DDD (degenerative disc disease), lumbar Yes     Physician: Aparna Welch MD    Pain pattern determined: pattern 3 updated to 4 on 5/15/17 with intermittent leg pain  Plan of care signed: 17  POC due 17    Time in: 8:40  Time Out: 9:40 am  Total Treatment time: 60  Precautions: TKR, HTN, dizziness, fall     Visit #: 15    Evaluation: 17  Assessment due: 17, done 17  Next reassessment due: 17 done 17  Next reassessment due: 17    Subjective   Laura reports she is performing some HEP exercises. Over all she notes that her ability to walk to and from the clinic has improved with less fatigue and less pain.  She feels her stamina at home is improving.  However, she is still concerned with her balance, especially when going sit<>stand. She still reports intermittent leg and back pain, but reports slightly less pain and notes improved ability getting in and out of bed at home.  She reported 4/10  low back pain and leg pain pre visit, improved post visit.      Patient reports their pain to be 4/10 on a 0-10 scale with 0 being no pain and 10 being the worst pain imaginable.    Pain Location: lower back and legs     Work and leisure: retired  Pt goals: decrease pain, go to senior center    Objective       Baseline IM Testing Results:   Date of testin17  ROM 12-30 deg increased to 12-42 with ease visit 3   Max Peak Torque 69 ft lbs    Min Peak Torque 52 ft lbs    Flex/Ext Ratio 1.3/1   % below normative data 52% below         Midpoint  IM Testing Results:   Date of testin17  ROM 6-48   Max Peak Torque 110 ft lbs    Min Peak Torque 41 ft lbs    Flex/Ext Ratio 2.2/1   % below normative data 26% below     Average gain in strength 56% gain    Functional strength: at midpoint   Sit to  stand-need for hands  Stand to sit without hands  On and off mat, modified indep  On and off machines, modified indep  On and off bike, min assist for leg  amb without walker in gym with supervision        Treatment         Pt was instructed in and performed the following:  Cardiovascular exercise and therapeutic exercise to improve posture, lumbar spine and supporting musculature ROM, strength, and muscular endurance as follows:    [facility] 7/24/2017   Visit Number 15   VAS Pain Rating 4   Treadmill Time (in min.) -   Speed -   Recumbent Bike Seat Pos. 0   Time 0   Extension in Standing 10   Flexion in Lying -   Flexion in Sitting 10   Lumbar Weight 56   Repetitions 18   Rating of Perceived Exertion 3   Ice - Sitting 10     Seated hip flexion with single knee to chest 10x B NT  Seated trunk rotation 10x NT  Seated lumbar flexion stretches 3 sec hold x10   Seated scapular retractions 10x NT  Sit<>stand 10x (Pt able to perform with SBA and mild v/c for technique and safety)   Standing extension with LE supported 10x (Pt reporting improved low back symptoms with LE supportedr)    Balance work with SBA and UE support:  Standing ankle PF 10x  Standing hip abduction 10x  Standing knee flexion 10x      Peripheral muscle strengthening which included 1 set of 15-20 repetitions at a slow, controlled 7 second per rep pace focused on strengthening supporting musculature for improved body mechanics and functional mobility.  Pt and therapist focused on proper form during treatment to ensure optimal strengthening of each targeted muscle group.  Machines were utilized including  leg extension, leg curl, chest press, rowing and triceps.  Added biceps with free weights 20 reps 1#'s    Written Home Exercises Provided:   Handouts were given to the patient. Pt demo fair understanding of the education provided. Laura demonstrated fair return demonstration of activities.   Single knee to chest on sofa  Double knee to chest  on sofa  Lower trunk rotation  Seated lumbar flexion stretches 3 sec hold x10, 3x daily  Seated scapular retractions 10x, 3x daily   Standing ankle PF 10x  Standing hip abduction 10x  Standing knee flexion 10x    Standing extension with LE supported against mat 10x (trial added)   Standing ankle PF 10x  Standing hip abduction 10x  Standing knee flexion 10x      Lumbar roll use compliance: not yet  Additional exercises taught this treatment session:  Balance work added to therapy here, safety and fall precaution handouts given    Assessment     Patient attended with minimal back pain which reduced during visit, no worse with exercise and reduced after z lie and ice after visit.  Continued seated therex, balance work, functional training work.  Pt is improving in ability to perform with less assistance and v/c. Pt improved with sit<>stand trial today without the use of B UE support and increased fatigue.  Pt demonstrates slow transitions between peripheral exercise machines but is able to complete full circuit with improved ease and 1:1 SBA per fall risk. Pt will continue to benefit from skilled outpatient physical therapy to address the deficits stated in the impairment chart, provide pt/family education and to maximize pt's level of independence in the home and community environment. Pt's spiritual, cultural and educational needs considered and pt agreeable to plan of care and goals as stated below:     Medical necessity is demonstrated by the following IMPAIRMENTS/PROBLEMS: decreased ROM, weakness LE, lower back pain      GOALS: Pt is in agreement with the following goals.      Short term goals: 5 weeks or 10 visits  1.  Pt will demonstrate increased lumbar ROM as measured by med ex by at least 3 degrees from the initial ROM value with improvements noted in functional ROM and ability to perform ADL    MET WITH IMPROVED ABILITY TO DRESS 6/26/17  2.  Pt will demonstrate increased maximum isometric torque value by 5%  when compared to the initial value resulting in improved ability to function, stand, walk, lift items.   MET WITH IMPROVED ABILITY TO CARRY LAUNDRY 6/26/17  3.  Pt will tolerate regular 5% increases in dynamic weight loads on all machines   MET  4.  Patient report a reduction in worst pain score by 1-2 points for improved tolerance during work and recreational activities   MET  5.  Pt able to perform HEP correctly with minimal cueing or supervision for therapist  ONGOING  6. Pt will demonstrate improvement in flexion/extension strength ratio compared in initial value   MET    Long term goals: 10 weeks or 20 visits  1. Pt will demonstrate increased lumbar ROM by at least 6 degrees from initial ROM value, resulting in improved ability to perform functional fwd bending while standing and sitting.    2. Pt will demonstrate increased maximum isometric torque value by 10% when compared to the initial value, resulting in improved ability to perform bending, lifting, and carrying activities safely, confidently, and 4/10 pain or less.   3. Pt will be able to ambulate community distances safely, confidently, and 4/10 pain or less.  Return to community center  4. Pt to demonstrate ability to independently control and reduce their pain through posture positioning and mechanical movements throughout day.  Go out with friends or go to store  5. Pt able to sleep through the night without waking with c/o pain. Sleep in bed not chair  6. Pt able to perform household cooking/cleaning ADLS safely, confidently, and 4/10 pain or less.  7. Pt to be able to perform self care and grooming ADLs safely, confidently, independently, and4/10 pain or less.   8. Pt will be able to ascend/descend 1 flight of stairs reciprocally with use of unilateral handrail for safety, confidently and 2/10 pain or less.        OUTCOMES:  Initial FOTO:  78 % limited CL  FOTO Goal: 40-60 % limited CK  Midpoint FOTO: 61% limited CL   FOTO Goal: 40-60 % limited  CK  FOTO 65% at midpoint, visit 10, with assist needed for balance, sit to stand, fall precautions, mobility        Plan   Continue with established Plan of Care towards established PT goals.  Plan to continue to add balance and perform gait training activity as appropriate next session.  Plan to perform walking circles around clinic for warm up.

## 2017-07-26 ENCOUNTER — CLINICAL SUPPORT (OUTPATIENT)
Dept: REHABILITATION | Facility: OTHER | Age: 77
End: 2017-07-26
Attending: PHYSICAL MEDICINE & REHABILITATION
Payer: MEDICARE

## 2017-07-26 DIAGNOSIS — M51.36 DDD (DEGENERATIVE DISC DISEASE), LUMBAR: Primary | ICD-10-CM

## 2017-07-26 PROCEDURE — 97110 THERAPEUTIC EXERCISES: CPT

## 2017-07-26 NOTE — PROGRESS NOTES
[facility]      Name: Laura Curry  Clinic Number: [id number]  Date of Treatment: 2017   Diagnosis:   No diagnosis found.  Physician: Aparna Welch MD    Pain pattern determined: pattern 3 updated to 4 on 5/15/17 with intermittent leg pain  Plan of care signed: 17  POC due 17    Time in: 7:00  Time Out: 8:00 am  Total Treatment time: 60  Precautions: TKR, HTN, dizziness, fall     Visit #: 16    Evaluation: 17  Assessment due: 17, done 17  Next reassessment due: 17 done 17  Next reassessment due: 17    Subjective   Laura reports she is performing some HEP exercises. Over all she notes that her ability to walk to and from the clinic has improved with less fatigue and less pain.  She feels her stamina at home is improving.  However, she is still concerned with her balance, especially when going sit<>stand. She still reports intermittent leg and back pain, but reports slightly less pain and notes improved ability getting in and out of bed at home.  She reported 0/10  low back painpre visit.      Patient reports their pain to be 0/10 on a 0-10 scale with 0 being no pain and 10 being the worst pain imaginable.    Pain Location: lower back and legs     Work and leisure: retired  Pt goals: decrease pain, go to [employer]    Objective       Baseline IM Testing Results:   Date of testin17  ROM 12-30 deg increased to 12-42 with ease visit 3   Max Peak Torque 69 ft lbs    Min Peak Torque 52 ft lbs    Flex/Ext Ratio 1.3/1   % below normative data 52% below         [facility]point  IM Testing Results:   Date of testin17  ROM 6-48   Max Peak Torque 110 ft lbs    Min Peak Torque 41 ft lbs    Flex/Ext Ratio 2.2/1   % below normative data 26% below     Average gain in strength 56% gain    Functional strength: at midpoint   Sit to stand-need for hands  Stand to sit without hands  On and off mat, modified indep  On and off machines,  modified indep  On and off bike, min assist for leg  amb without walker in gym with supervision        Treatment         Pt was instructed in and performed the following:  Cardiovascular exercise and therapeutic exercise to improve posture, lumbar spine and supporting musculature ROM, strength, and muscular endurance as follows:  [facility] 7/26/2017   Visit Number 16   VAS Pain Rating 0   Treadmill Time (in min.) -   Speed -   Recumbent Bike Seat Pos. 0   Time 0   Extension in Standing 10   Flexion in Lying -   Flexion in Sitting 10   Lumbar Weight 59   Repetitions 20   Rating of Perceived Exertion 3   Ice - Sitting 10       Seated hip flexion with single knee to chest 10x B  Seated trunk rotation 10x   Seated lumbar flexion stretches 3 sec hold x10   Seated scapular retractions 10x NT  Sit<>stand 10x (Pt able to perform with SBA and mild v/c for technique and safety) (NT)  Standing extension with LE supported 10x (Pt reporting improved low back symptoms with LE supportedr)    Balance work with SBA and UE support:  Standing ankle PF 10x  Standing hip abduction 10x  Standing knee flexion 10x      Peripheral muscle strengthening which included 1 set of 15-20 repetitions at a slow, controlled 7 second per rep pace focused on strengthening supporting musculature for improved body mechanics and functional mobility.  Pt and therapist focused on proper form during treatment to ensure optimal strengthening of each targeted muscle group.  Machines were utilized including  leg extension, leg curl, chest press, rowing and triceps.  Added biceps with free weights 20 reps 1#'s    Written Home Exercises Provided:   Handouts were given to the patient. Pt demo fair understanding of the education provided. Laura demonstrated fair return demonstration of activities.   Single knee to chest on sofa  Double knee to chest on sofa  Lower trunk rotation  Seated lumbar flexion stretches 3 sec hold x10, 3x daily  Seated scapular  retractions 10x, 3x daily   Standing ankle PF 10x  Standing hip abduction 10x  Standing knee flexion 10x    Standing extension with LE supported against mat 10x (trial added)   Standing ankle PF 10x  Standing hip abduction 10x  Standing knee flexion 10x      Lumbar roll use compliance: not yet  Additional exercises taught this treatment session:  Balance work added to therapy here, safety and fall precaution handouts given    Assessment     Patient attended with no back pain which reduced during visit, no worse with exercise and reduced after z lie and ice after visit. No warmup done due to pts refusal  Continued seated therex, balance work, functional training work.  Pt is improving in ability to perform with less assistance and v/c.  Pt demonstrates slow transitions between peripheral exercise machines but is able to complete full circuit with improved ease and 1:1 SBA per fall risk. Pt will continue to benefit from skilled outpatient physical therapy to address the deficits stated in the impairment chart, provide pt/family education and to maximize pt's level of independence in the home and community environment. Pt's spiritual, cultural and educational needs considered and pt agreeable to plan of care and goals as stated below:     Medical necessity is demonstrated by the following IMPAIRMENTS/PROBLEMS: decreased ROM, weakness LE, lower back pain      GOALS: Pt is in agreement with the following goals.      Short term goals: 5 weeks or 10 visits  1.  Pt will demonstrate increased lumbar ROM as measured by med ex by at least 3 degrees from the initial ROM value with improvements noted in functional ROM and ability to perform ADL    MET WITH IMPROVED ABILITY TO DRESS 6/26/17  2.  Pt will demonstrate increased maximum isometric torque value by 5% when compared to the initial value resulting in improved ability to function, stand, walk, lift items.   MET WITH IMPROVED ABILITY TO CARRY LAUNDRY 6/26/17  3.  Pt will  tolerate regular 5% increases in dynamic weight loads on all machines   MET  4.  Patient report a reduction in worst pain score by 1-2 points for improved tolerance during work and recreational activities   MET  5.  Pt able to perform HEP correctly with minimal cueing or supervision for therapist  ONGOING  6. Pt will demonstrate improvement in flexion/extension strength ratio compared in initial value   MET    Long term goals: 10 weeks or 20 visits  1. Pt will demonstrate increased lumbar ROM by at least 6 degrees from initial ROM value, resulting in improved ability to perform functional fwd bending while standing and sitting.    2. Pt will demonstrate increased maximum isometric torque value by 10% when compared to the initial value, resulting in improved ability to perform bending, lifting, and carrying activities safely, confidently, and 4/10 pain or less.   3. Pt will be able to ambulate community distances safely, confidently, and 4/10 pain or less.  Return to community center  4. Pt to demonstrate ability to independently control and reduce their pain through posture positioning and mechanical movements throughout day.  Go out with friends or go to store  5. Pt able to sleep through the night without waking with c/o pain. Sleep in bed not chair  6. Pt able to perform household cooking/cleaning ADLS safely, confidently, and 4/10 pain or less.  7. Pt to be able to perform self care and grooming ADLs safely, confidently, independently, and4/10 pain or less.   8. Pt will be able to ascend/descend 1 flight of stairs reciprocally with use of unilateral handrail for safety, confidently and 2/10 pain or less.        OUTCOMES:  Initial FOTO:  78 % limited CL  FOTO Goal: 40-60 % limited CK  Midpoint FOTO: 61% limited CL   FOTO Goal: 40-60 % limited CK  FOTO 65% at midpoint, visit 10, with assist needed for balance, sit to stand, fall precautions, mobility        Plan   Continue with established Plan of Care towards  established PT goals.  Plan to continue to add balance and perform gait training activity as appropriate next session.  Plan to perform walking circles around clinic for warm up.

## 2017-08-01 ENCOUNTER — CLINICAL SUPPORT (OUTPATIENT)
Dept: REHABILITATION | Facility: OTHER | Age: 77
End: 2017-08-01
Attending: PHYSICAL MEDICINE & REHABILITATION
Payer: MEDICARE

## 2017-08-01 DIAGNOSIS — M51.36 DDD (DEGENERATIVE DISC DISEASE), LUMBAR: Primary | ICD-10-CM

## 2017-08-01 PROCEDURE — 97110 THERAPEUTIC EXERCISES: CPT

## 2017-08-01 NOTE — PROGRESS NOTES
[facility]      Name: Laura Curry  Clinic Number: [id number]  Date of Treatment: 2017   Diagnosis:   Encounter Diagnosis   Name Primary?    DDD (degenerative disc disease), lumbar Yes     Physician: Aparna Welch MD    Pain pattern determined: pattern 3 updated to 4 on 5/15/17 with intermittent leg pain  Plan of care signed: 17  POC due 17    Time in: 7:00  Time Out: 8:00 am  Total Treatment time: 60  Precautions: TKR, HTN, dizziness, fall     Visit #: 17    Evaluation: 17  Assessment due: 17, done 17  Next reassessment due: 17 done 17  Next reassessment due: 17, done 17  Next reassessment due: 17    Subjective   Laura reports she is performing HEP exercises more regularly. Over all she notes that her ability to walk to and from the clinic has improved with less fatigue and less pain.  She feels her stamina at home is improving.  However, she is still concerned with her balance, especially when going sit<>stand. She still reports intermittent leg and back pain, but reports slightly less pain and notes improved ability getting in and out of bed at home.  She reported 0/10  low back pain pre visit.  Pt will not be able to continue with wellness program per financial and transportation restrictions.     Patient reports their pain to be 0/10 on a 0-10 scale with 0 being no pain and 10 being the worst pain imaginable.    Pain Location: lower back and legs     Work and leisure: retired  Pt goals: decrease pain, go to [employer]    Objective       Baseline IM Testing Results:   Date of testin17  ROM 12-30 deg increased to 12-42 with ease visit 3   Max Peak Torque 69 ft lbs    Min Peak Torque 52 ft lbs    Flex/Ext Ratio 1.3/1   % below normative data 52% below         Midpoint  IM Testing Results:   Date of testin17  ROM 6-48   Max Peak Torque 110 ft lbs    Min Peak Torque 41 ft lbs    Flex/Ext Ratio 2.2/1    % below normative data 26% below     Average gain in strength 56% gain    Functional strength: at midpoint   Sit to stand-need for hands  Stand to sit without hands  On and off mat, modified indep  On and off machines, modified indep  On and off bike, min assist for leg  amb without walker in gym with supervision        Treatment         Pt was instructed in and performed the following:  Cardiovascular exercise and therapeutic exercise to improve posture, lumbar spine and supporting musculature ROM, strength, and muscular endurance as follows:  [facility] 8/1/2017   Visit Number 17   VAS Pain Rating 0   Treadmill Time (in min.) -   Speed -   Recumbent Bike Seat Pos. 0   Time 0   Extension in Standing 10   Flexion in Lying -   Flexion in Sitting 10   Lumbar Extension Seat Pad -   Femur Restraint -   Top Dead Center -   Counterweight -   Lumbar Flexion -   Lumbar Extension -   Lumbar Peak Torque -   Min Torque -   Percent From Norm -   Percent Change from Initial -   Lumbar Weight 62   Repetitions 14   Rating of Perceived Exertion 5   Ice - Sitting 10     Seated hip flexion with single knee to chest 10x B  Seated trunk rotation 10x   Seated lumbar flexion stretches 3 sec hold x10   Seated knee extensions 10x bilaterally   Seated scapular retractions 10x   Sit<>stand 10x (Pt able to perform independently with SBA)   Standing extension with LE supported 10x (Pt reporting improved low back symptoms with LE supported)    Balance work with SBA and UE support:  Standing ankle PF 10x  Standing hip abduction 10x  Standing knee flexion 10x      Peripheral muscle strengthening which included 1 set of 15-20 repetitions at a slow, controlled 7 second per rep pace focused on strengthening supporting musculature for improved body mechanics and functional mobility.  Pt and therapist focused on proper form during treatment to ensure optimal strengthening of each targeted muscle group.  Machines were utilized including  leg  extension, leg curl, chest press, rowing and triceps.  Added biceps with free weights 20 reps 1#'s    Written Home Exercises Provided:   Handouts were given to the patient. Pt demo fair understanding of the education provided. Laura demonstrated fair return demonstration of activities.   Single knee to chest on sofa  Double knee to chest on sofa  Lower trunk rotation  Seated lumbar flexion stretches 3 sec hold x10, 3x daily  Seated scapular retractions 10x, 3x daily   Standing ankle PF 10x  Standing hip abduction 10x  Standing knee flexion 10x    Standing extension with LE supported against mat 10x (trial added)   Standing ankle PF 10x  Standing hip abduction 10x  Standing knee flexion 10x      Lumbar roll use compliance: not yet  Additional exercises taught this treatment session:  HEP Review, knee extension  Assessment     Patient attended with no back pain which reduced during visit, no worse with exercise and reduced after z lie and ice after visit. No warmup done due to pts refusal.  Continued seated therex, balance work, functional training work. Pt is improving in ability to perform with less assistance and v/c.  Pt demonstrates slow transitions between peripheral exercise machines but is able to complete full circuit with improved ease and 1:1 SBA per fall risk. Pt will not be able to continue with wellness program per financial and transportation restrictions. Plan to continue to add body weight and AROM exercises to mimic resistance exercise program for home. Pt will continue to benefit from skilled outpatient physical therapy to address the deficits stated in the impairment chart, provide pt/family education and to maximize pt's level of independence in the home and community environment. Pt's spiritual, cultural and educational needs considered and pt agreeable to plan of care and goals as stated below:     Medical necessity is demonstrated by the following IMPAIRMENTS/PROBLEMS: decreased ROM,  weakness LE, lower back pain      GOALS: Pt is in agreement with the following goals.      Short term goals: 5 weeks or 10 visits  1.  Pt will demonstrate increased lumbar ROM as measured by med ex by at least 3 degrees from the initial ROM value with improvements noted in functional ROM and ability to perform ADL    MET WITH IMPROVED ABILITY TO DRESS 6/26/17  2.  Pt will demonstrate increased maximum isometric torque value by 5% when compared to the initial value resulting in improved ability to function, stand, walk, lift items.   MET WITH IMPROVED ABILITY TO CARRY LAUNDRY 6/26/17  3.  Pt will tolerate regular 5% increases in dynamic weight loads on all machines   MET  4.  Patient report a reduction in worst pain score by 1-2 points for improved tolerance during work and recreational activities   MET  5.  Pt able to perform HEP correctly with minimal cueing or supervision for therapist  ONGOING  6. Pt will demonstrate improvement in flexion/extension strength ratio compared in initial value   MET    Long term goals: 10 weeks or 20 visits  1. Pt will demonstrate increased lumbar ROM by at least 6 degrees from initial ROM value, resulting in improved ability to perform functional fwd bending while standing and sitting.    2. Pt will demonstrate increased maximum isometric torque value by 10% when compared to the initial value, resulting in improved ability to perform bending, lifting, and carrying activities safely, confidently, and 4/10 pain or less.   3. Pt will be able to ambulate community distances safely, confidently, and 4/10 pain or less.  Met 8/1/17 Return to community center  4. Pt to demonstrate ability to independently control and reduce their pain through posture positioning and mechanical movements throughout day.  Go out with friends or go to store  5. Pt able to sleep through the night without waking with c/o pain. Sleep in bed not chair  Met 8/1/17  6. Pt able to perform household cooking/cleaning  ADLS safely, confidently, and 4/10 pain or less.  7. Pt to be able to perform self care and grooming ADLs safely, confidently, independently, and4/10 pain or less.   8. Pt will be able to ascend/descend 1 flight of stairs reciprocally with use of unilateral handrail for safety, confidently and 2/10 pain or less.        OUTCOMES:  Initial FOTO:  78 % limited CL  FOTO Goal: 40-60 % limited CK  Midpoint FOTO: 61% limited CL   FOTO Goal: 40-60 % limited CK  FOTO 65% at midpoint, visit 10, with assist needed for balance, sit to stand, fall precautions, mobility        Plan   Continue with established Plan of Care towards established PT goals.  Plan to continue to add balance and perform gait training activity as appropriate next session.  Plan to perform walking circles around clinic for warm up.

## 2017-08-03 ENCOUNTER — CLINICAL SUPPORT (OUTPATIENT)
Dept: REHABILITATION | Facility: OTHER | Age: 77
End: 2017-08-03
Attending: PHYSICAL MEDICINE & REHABILITATION
Payer: MEDICARE

## 2017-08-03 DIAGNOSIS — M51.36 DDD (DEGENERATIVE DISC DISEASE), LUMBAR: Primary | ICD-10-CM

## 2017-08-03 PROCEDURE — 97110 THERAPEUTIC EXERCISES: CPT

## 2017-08-03 NOTE — PROGRESS NOTES
[facility]      Name: Laura Curry  Clinic Number: [id number]  Date of Treatment: 2017   Diagnosis:   No diagnosis found.  Physician: Aparna Welch MD    Pain pattern determined: pattern 3 updated to 4 on 5/15/17 with intermittent leg pain  Plan of care signed: 17  POC due 17    Time in: 8:20  Time Out: 9:30 am  Total Treatment time: 60  Precautions: TKR, HTN, dizziness, fall     Visit #: 18    Evaluation: 17  Assessment due: 17, done 17  Next reassessment due: 17 done 17  Next reassessment due: 17, done 17  Next reassessment due: 17    Subjective   Laura reports she is doing well.  She states that she does not have back pain but states that she is most concerned with her balance.  She states that her HEP is going well.  She is doing them more often.     Patient reports their pain to be 0/10 on a 0-10 scale with 0 being no pain and 10 being the worst pain imaginable.    Pain Location: lower back and legs     Work and leisure: retired  Pt goals: decrease pain, go to [employer]    Objective       Baseline IM Testing Results:   Date of testin17  ROM 12-30 deg increased to 12-42 with ease visit 3   Max Peak Torque 69 ft lbs    Min Peak Torque 52 ft lbs    Flex/Ext Ratio 1.3/1   % below normative data 52% below         Midpoint  IM Testing Results:   Date of testin17  ROM 6-48   Max Peak Torque 110 ft lbs    Min Peak Torque 41 ft lbs    Flex/Ext Ratio 2.2/1   % below normative data 26% below     Average gain in strength 56% gain    Functional strength: at midpoint   Sit to stand-need for hands  Stand to sit without hands  On and off mat, modified indep  On and off machines, modified indep  On and off bike, min assist for leg  amb without walker in gym with supervision        Treatment         Pt was instructed in and performed the following:  Cardiovascular exercise and therapeutic exercise to improve  posture, lumbar spine and supporting musculature ROM, strength, and muscular endurance as follows:    [facility] 8/3/2017   Visit Number 18   VAS Pain Rating 0   Treadmill Time (in min.) -   Speed -   Recumbent Bike Seat Pos. -   Time -   Extension in Standing 10   Flexion in Lying -   Flexion in Sitting 10   Lumbar Extension Seat Pad -   Femur Restraint -   Top Dead Center -   Counterweight -   Lumbar Flexion -   Lumbar Extension -   Lumbar Peak Torque -   Min Torque -   Percent From Norm -   Percent Change from Initial -   Lumbar Weight 62   Repetitions 17   Rating of Perceived Exertion 3   Ice - Sitting 10     Seated hip flexion with single knee to chest 10x B  Seated trunk rotation 10x   Seated lumbar flexion stretches 3 sec hold x10   Seated knee extensions 10x bilaterally   Seated scapular retractions 10x   Standing extension with LE supported 10x (Pt reporting improved low back symptoms with LE supported)    Balance work with SBA and UE support:  Standing ankle PF 10x  Standing hip abduction 10x  Standing knee flexion 10x      FT EO - 30sec SBA  FT EC- 30 sec SBA  Feet staggered EO - 30 sec SBA  Feet staggered EC - 30 sec CGA.      Peripheral muscle strengthening which included 1 set of 15-20 repetitions at a slow, controlled 7 second per rep pace focused on strengthening supporting musculature for improved body mechanics and functional mobility.  Pt and therapist focused on proper form during treatment to ensure optimal strengthening of each targeted muscle group.  Machines were utilized including  leg extension, leg curl, chest press, rowing and triceps.  Added biceps with free weights 20 reps 1#'s    Written Home Exercises Provided:   Handouts were given to the patient. Pt demo fair understanding of the education provided. Laura demonstrated fair return demonstration of activities.   Single knee to chest on sofa  Double knee to chest on sofa  Lower trunk rotation  Seated lumbar flexion  stretches 3 sec hold x10, 3x daily  Seated scapular retractions 10x, 3x daily   Standing ankle PF 10x  Standing hip abduction 10x  Standing knee flexion 10x    Standing extension with LE supported against mat 10x (trial added)   Standing ankle PF 10x  Standing hip abduction 10x  Standing knee flexion 10x      Lumbar roll use compliance: not yet  Additional exercises taught this treatment session:  Standing balance progression.   Assessment     Patient attended with no back pain.  No warmup done due to pts refusal.  Continued seated therex, balance work, functional training work. Progressed balance work with standing balance progression and she tolerated tx well.  One LOB episode with staggered feet and eyes closed. Pt is improving in ability to perform with less assistance and v/c.  Pt demonstrates slow transitions between peripheral exercise machines but is able to complete full circuit with improved ease and 1:1 SBA per fall risk. Patient tolerated all machines well. Patient is planning on continuing with HEP following DC but has expressed interest in wellness. Pt will continue to benefit from skilled outpatient physical therapy to address the deficits stated in the impairment chart, provide pt/family education and to maximize pt's level of independence in the home and community environment. Pt's spiritual, cultural and educational needs considered and pt agreeable to plan of care and goals as stated below:     Medical necessity is demonstrated by the following IMPAIRMENTS/PROBLEMS: decreased ROM, weakness LE, lower back pain      GOALS: Pt is in agreement with the following goals.      Short term goals: 5 weeks or 10 visits  1.  Pt will demonstrate increased lumbar ROM as measured by med ex by at least 3 degrees from the initial ROM value with improvements noted in functional ROM and ability to perform ADL    MET WITH IMPROVED ABILITY TO DRESS 6/26/17  2.  Pt will demonstrate increased maximum isometric torque  value by 5% when compared to the initial value resulting in improved ability to function, stand, walk, lift items.   MET WITH IMPROVED ABILITY TO CARRY LAUNDRY 6/26/17  3.  Pt will tolerate regular 5% increases in dynamic weight loads on all machines   MET  4.  Patient report a reduction in worst pain score by 1-2 points for improved tolerance during work and recreational activities   MET  5.  Pt able to perform HEP correctly with minimal cueing or supervision for therapist  ONGOING  6. Pt will demonstrate improvement in flexion/extension strength ratio compared in initial value   MET    Long term goals: 10 weeks or 20 visits  1. Pt will demonstrate increased lumbar ROM by at least 6 degrees from initial ROM value, resulting in improved ability to perform functional fwd bending while standing and sitting.    2. Pt will demonstrate increased maximum isometric torque value by 10% when compared to the initial value, resulting in improved ability to perform bending, lifting, and carrying activities safely, confidently, and 4/10 pain or less.   3. Pt will be able to ambulate community distances safely, confidently, and 4/10 pain or less.  Met 8/1/17 Return to [facility]  4. Pt to demonstrate ability to independently control and reduce their pain through posture positioning and mechanical movements throughout day.  Go out with friends or go to store  5. Pt able to sleep through the night without waking with c/o pain. Sleep in bed not chair  Met 8/1/17  6. Pt able to perform household cooking/cleaning ADLS safely, confidently, and 4/10 pain or less.  7. Pt to be able to perform self care and grooming ADLs safely, confidently, independently, and4/10 pain or less.   8. Pt will be able to ascend/descend 1 flight of stairs reciprocally with use of unilateral handrail for safety, confidently and 2/10 pain or less.        OUTCOMES:  Initial FOTO:  78 % limited CL  FOTO Goal: 40-60 % limited CK  Midpoint FOTO: 61% limited  CL   FOTO Goal: 40-60 % limited CK  FOTO 65% at midpoint, visit 10, with assist needed for balance, sit to stand, fall precautions, mobility        Plan   Continue with established Plan of Care towards established PT goals.  Plan to continue to add balance and perform gait training activity as appropriate next session.  Plan to perform walking circles around clinic for warm up.

## 2017-08-08 ENCOUNTER — CLINICAL SUPPORT (OUTPATIENT)
Dept: REHABILITATION | Facility: OTHER | Age: 77
End: 2017-08-08
Attending: PHYSICAL MEDICINE & REHABILITATION
Payer: MEDICARE

## 2017-08-08 DIAGNOSIS — M51.36 DDD (DEGENERATIVE DISC DISEASE), LUMBAR: Primary | ICD-10-CM

## 2017-08-08 PROCEDURE — 97110 THERAPEUTIC EXERCISES: CPT

## 2017-08-08 NOTE — PROGRESS NOTES
[facility]      Name: Laura Curry  Clinic Number: [id number]  Date of Treatment: 2017   Diagnosis:   Encounter Diagnosis   Name Primary?    DDD (degenerative disc disease), lumbar Yes     Physician: Aparna Welch MD    Pain pattern determined: pattern 3 updated to 4 on 5/15/17 with intermittent leg pain  Plan of care signed: 17  POC due 17, sent 17    Time in: 8:30  Time Out: 9:30 am  Total Treatment time: 60  Precautions: TKR, HTN, dizziness, fall     Visit #: 19    Evaluation: 17  Assessment due: 17, done 17  Next reassessment due: 17 done 17  Next reassessment due: 17, done 17  Next reassessment due: 17    Subjective   Laura reports she is doing well.  She states that she does have some minor soreness and stiffness but it improved during and post-session. She is most concerned with her balance but states it has improved since starting PT.  She states that her HEP is going well.  She is doing them more often.     Patient reports their pain to be 2/10 on a 0-10 scale with 0 being no pain and 10 being the worst pain imaginable.    Pain Location: lower back and legs     Work and leisure: retired  Pt goals: decrease pain, go to senior center    Objective       Baseline IM Testing Results:   Date of testin17  ROM 12-30 deg increased to 12-42 with ease visit 3   Max Peak Torque 69 ft lbs    Min Peak Torque 52 ft lbs    Flex/Ext Ratio 1.3/1   % below normative data 52% below         Midpoint  IM Testing Results:   Date of testin17  ROM 6-48   Max Peak Torque 110 ft lbs    Min Peak Torque 41 ft lbs    Flex/Ext Ratio 2.2/1   % below normative data 26% below     Average gain in strength 56% gain    Functional strength: at midpoint   Sit to stand-need for hands  Stand to sit without hands  On and off mat, modified indep  On and off machines, modified indep  On and off bike, min assist for leg  amb without  walker in gym with supervision        Treatment         Pt was instructed in and performed the following:  Cardiovascular exercise and therapeutic exercise to improve posture, lumbar spine and supporting musculature ROM, strength, and muscular endurance as follows:    [facility] 8/8/2017   Visit Number 19   VAS Pain Rating 2   Treadmill Time (in min.) -   Speed -   Recumbent Bike Seat Pos. -   Time -   Extension in Standing 10   Flexion in Lying -   Flexion in Sitting 10   Lumbar Extension Seat Pad -   Femur Restraint -   Top Dead Center -   Counterweight -   Lumbar Flexion -   Lumbar Extension -   Lumbar Peak Torque -   Min Torque -   Percent From Norm -   Percent Change from Initial -   Lumbar Weight 62   Repetitions 15   Rating of Perceived Exertion 6   Ice - Sitting 10       Seated hip flexion with single knee to chest 10x B  Seated trunk rotation 10x   Seated lumbar flexion stretches 3 sec hold x10   Seated knee extensions 10x bilaterally   Seated scapular retractions 10x   Standing extension with LE supported 10x     Ambulation around clinic: 2 laps SBA (Mild v/c to maintain upright posture, pt required 2 resting breaks)     Balance work with SBA and UE support: NT  Standing ankle PF 10x NT  Standing hip abduction 10x NT  Standing knee flexion 10x  NT    Peripheral muscle strengthening which included 1 set of 15-20 repetitions at a slow, controlled 7 second per rep pace focused on strengthening supporting musculature for improved body mechanics and functional mobility.  Pt and therapist focused on proper form during treatment to ensure optimal strengthening of each targeted muscle group.  Machines were utilized including  leg extension, leg curl, chest press, rowing and triceps.  Added biceps with free weights 20 reps 1#'s    Written Home Exercises Provided:   Handouts were given to the patient. Pt demo fair understanding of the education provided. Laura demonstrated fair return demonstration of  activities.   Single knee to chest on sofa  Double knee to chest on sofa  Lower trunk rotation  Seated lumbar flexion stretches 3 sec hold x10, 3x daily  Seated scapular retractions 10x, 3x daily   Standing ankle PF 10x  Standing hip abduction 10x  Standing knee flexion 10x    Standing extension with LE supported against mat 10x (trial added)   Standing ankle PF 10x  Standing hip abduction 10x  Standing knee flexion 10x      Lumbar roll use compliance: not yet  Additional exercises taught this treatment session:  .   Assessment     Patient attended with no back pain.  Pt ambulated around clinic for 2 laps with SBA and required 2 resting breaks per increased leg fatigue.  Continued seated therex, balance work, functional training work. Progressed balance work with standing balance progression and she tolerated tx well. Pt is improving in ability to perform with less assistance and v/c.  Pt demonstrates slow transitions between peripheral exercise machines but is able to complete full circuit with improved ease and 1:1 SBA per fall risk. Patient tolerated all machines well. Patient is planning on continuing with HEP following DC but has expressed interest in wellness. Pt has improved lumbar and peripheral strength, reduced low back pain, and improved independence with transiitonal movements. She continues to demonstrate balance deficits, poor ambulation endurance, and difficulty descending/ascending stairs. Pt may benefit from referral to functional and balance based PT program to continue to address balance and mobility deficits. Pt will continue to benefit from skilled outpatient physical therapy to address the deficits stated in the impairment chart, provide pt/family education and to maximize pt's level of independence in the home and community environment. Pt's spiritual, cultural and educational needs considered and pt agreeable to plan of care and goals as stated below:     Medical necessity is demonstrated by  the following IMPAIRMENTS/PROBLEMS: decreased ROM, weakness LE, lower back pain      GOALS: Pt is in agreement with the following goals.      Short term goals: 5 weeks or 10 visits  1.  Pt will demonstrate increased lumbar ROM as measured by med ex by at least 3 degrees from the initial ROM value with improvements noted in functional ROM and ability to perform ADL    MET WITH IMPROVED ABILITY TO DRESS 6/26/17  2.  Pt will demonstrate increased maximum isometric torque value by 5% when compared to the initial value resulting in improved ability to function, stand, walk, lift items.   MET WITH IMPROVED ABILITY TO CARRY LAUNDRY 6/26/17  3.  Pt will tolerate regular 5% increases in dynamic weight loads on all machines   MET  4.  Patient report a reduction in worst pain score by 1-2 points for improved tolerance during work and recreational activities   MET  5.  Pt able to perform HEP correctly with minimal cueing or supervision for therapist  ONGOING  6. Pt will demonstrate improvement in flexion/extension strength ratio compared in initial value   MET    Long term goals: 10 weeks or 20 visits  1. Pt will demonstrate increased lumbar ROM by at least 6 degrees from initial ROM value, resulting in improved ability to perform functional fwd bending while standing and sitting.    2. Pt will demonstrate increased maximum isometric torque value by 10% when compared to the initial value, resulting in improved ability to perform bending, lifting, and carrying activities safely, confidently, and 4/10 pain or less.   3. Pt will be able to ambulate community distances safely, confidently, and 4/10 pain or less.  Met 8/1/17 Return to community center  4. Pt to demonstrate ability to independently control and reduce their pain through posture positioning and mechanical movements throughout day.  Go out with friends or go to store  5. Pt able to sleep through the night without waking with c/o pain. Sleep in bed not chair  Met  8/1/17  6. Pt able to perform household cooking/cleaning ADLS safely, confidently, and 4/10 pain or less.  7. Pt to be able to perform self care and grooming ADLs safely, confidently, independently, and4/10 pain or less.   8. Pt will be able to ascend/descend 1 flight of stairs reciprocally with use of unilateral handrail for safety, confidently and 2/10 pain or less.        OUTCOMES:  Initial FOTO:  78 % limited CL  FOTO Goal: 40-60 % limited CK  Midpoint FOTO: 61% limited CL   FOTO Goal: 40-60 % limited CK  FOTO 65% at midpoint, visit 10, with assist needed for balance, sit to stand, fall precautions, mobility        Plan   Continue with established Plan of Care towards established PT goals. Retest and DC to wellness or transition to alternative functional and balance based PT program.

## 2017-08-09 NOTE — PLAN OF CARE
[facility]      Name: Laura Curry  Clinic Number: [id number]  Date of Treatment: 2017   Diagnosis:   Encounter Diagnosis   Name Primary?    DDD (degenerative disc disease), lumbar Yes     Physician: Aparna Welch MD    Pain pattern determined: pattern 3 updated to 4 on 5/15/17 with intermittent leg pain  Plan of care signed: 17  POC due 17, sent 17    Time in: 8:30  Time Out: 9:30 am  Total Treatment time: 60  Precautions: TKR, HTN, dizziness, fall     Visit #: 19    Evaluation: 17  Assessment due: 17, done 17  Next reassessment due: 17 done 17  Next reassessment due: 17, done 17  Next reassessment due: 17    Subjective   Laura reports she is doing well.  She states that she does have some minor soreness and stiffness but it improved during and post-session. She is most concerned with her balance but states it has improved since starting PT.  She states that her HEP is going well.  She is doing them more often.     Patient reports their pain to be 2/10 on a 0-10 scale with 0 being no pain and 10 being the worst pain imaginable.    Pain Location: lower back and legs     Work and leisure: retired  Pt goals: decrease pain, go to senior center    Objective       Baseline IM Testing Results:   Date of testin17  ROM 12-30 deg increased to 12-42 with ease visit 3   Max Peak Torque 69 ft lbs    Min Peak Torque 52 ft lbs    Flex/Ext Ratio 1.3/1   % below normative data 52% below         Midpoint  IM Testing Results:   Date of testin17  ROM 6-48   Max Peak Torque 110 ft lbs    Min Peak Torque 41 ft lbs    Flex/Ext Ratio 2.2/1   % below normative data 26% below     Average gain in strength 56% gain    Functional strength: at midpoint   Sit to stand-need for hands  Stand to sit without hands  On and off mat, modified indep  On and off machines, modified indep  On and off bike, min assist for leg  amb without  walker in gym with supervision        Treatment         Pt was instructed in and performed the following:  Cardiovascular exercise and therapeutic exercise to improve posture, lumbar spine and supporting musculature ROM, strength, and muscular endurance as follows:    [facility] 8/8/2017   Visit Number 19   VAS Pain Rating 2   Treadmill Time (in min.) -   Speed -   Recumbent Bike Seat Pos. -   Time -   Extension in Standing 10   Flexion in Lying -   Flexion in Sitting 10   Lumbar Extension Seat Pad -   Femur Restraint -   Top Dead Center -   Counterweight -   Lumbar Flexion -   Lumbar Extension -   Lumbar Peak Torque -   Min Torque -   Percent From Norm -   Percent Change from Initial -   Lumbar Weight 62   Repetitions 15   Rating of Perceived Exertion 6   Ice - Sitting 10       Seated hip flexion with single knee to chest 10x B  Seated trunk rotation 10x   Seated lumbar flexion stretches 3 sec hold x10   Seated knee extensions 10x bilaterally   Seated scapular retractions 10x   Standing extension with LE supported 10x     Ambulation around clinic: 2 laps SBA (Mild v/c to maintain upright posture, pt required 2 resting breaks)     Balance work with SBA and UE support: NT  Standing ankle PF 10x NT  Standing hip abduction 10x NT  Standing knee flexion 10x  NT    Peripheral muscle strengthening which included 1 set of 15-20 repetitions at a slow, controlled 7 second per rep pace focused on strengthening supporting musculature for improved body mechanics and functional mobility.  Pt and therapist focused on proper form during treatment to ensure optimal strengthening of each targeted muscle group.  Machines were utilized including  leg extension, leg curl, chest press, rowing and triceps.  Added biceps with free weights 20 reps 1#'s    Written Home Exercises Provided:   Handouts were given to the patient. Pt demo fair understanding of the education provided. Laura demonstrated fair return demonstration of  activities.   Single knee to chest on sofa  Double knee to chest on sofa  Lower trunk rotation  Seated lumbar flexion stretches 3 sec hold x10, 3x daily  Seated scapular retractions 10x, 3x daily   Standing ankle PF 10x  Standing hip abduction 10x  Standing knee flexion 10x    Standing extension with LE supported against mat 10x (trial added)   Standing ankle PF 10x  Standing hip abduction 10x  Standing knee flexion 10x      Lumbar roll use compliance: not yet  Additional exercises taught this treatment session:  .   Assessment     Patient attended with no back pain.  Pt ambulated around clinic for 2 laps with SBA and required 2 resting breaks per increased leg fatigue.  Continued seated therex, balance work, functional training work. Progressed balance work with standing balance progression and she tolerated tx well. Pt is improving in ability to perform with less assistance and v/c.  Pt demonstrates slow transitions between peripheral exercise machines but is able to complete full circuit with improved ease and 1:1 SBA per fall risk. Patient tolerated all machines well. Patient is planning on continuing with HEP following DC but has expressed interest in wellness. Pt has improved lumbar and peripheral strength, reduced low back pain, and improved independence with transiitonal movements. She continues to demonstrate balance deficits, poor ambulation endurance, and difficulty descending/ascending stairs. Pt may benefit from referral to functional and balance based PT program to continue to address balance and mobility deficits. Pt will continue to benefit from skilled outpatient physical therapy to address the deficits stated in the impairment chart, provide pt/family education and to maximize pt's level of independence in the home and community environment. Pt's spiritual, cultural and educational needs considered and pt agreeable to plan of care and goals as stated below:     Medical necessity is demonstrated by  the following IMPAIRMENTS/PROBLEMS: decreased ROM, weakness LE, lower back pain      GOALS: Pt is in agreement with the following goals.      Short term goals: 5 weeks or 10 visits  1.  Pt will demonstrate increased lumbar ROM as measured by med ex by at least 3 degrees from the initial ROM value with improvements noted in functional ROM and ability to perform ADL    MET WITH IMPROVED ABILITY TO DRESS 6/26/17  2.  Pt will demonstrate increased maximum isometric torque value by 5% when compared to the initial value resulting in improved ability to function, stand, walk, lift items.   MET WITH IMPROVED ABILITY TO CARRY LAUNDRY 6/26/17  3.  Pt will tolerate regular 5% increases in dynamic weight loads on all machines   MET  4.  Patient report a reduction in worst pain score by 1-2 points for improved tolerance during work and recreational activities   MET  5.  Pt able to perform HEP correctly with minimal cueing or supervision for therapist  ONGOING  6. Pt will demonstrate improvement in flexion/extension strength ratio compared in initial value   MET    Long term goals: 10 weeks or 20 visits  1. Pt will demonstrate increased lumbar ROM by at least 6 degrees from initial ROM value, resulting in improved ability to perform functional fwd bending while standing and sitting.    2. Pt will demonstrate increased maximum isometric torque value by 10% when compared to the initial value, resulting in improved ability to perform bending, lifting, and carrying activities safely, confidently, and 4/10 pain or less.   3. Pt will be able to ambulate community distances safely, confidently, and 4/10 pain or less.  Met 8/1/17 Return to community center  4. Pt to demonstrate ability to independently control and reduce their pain through posture positioning and mechanical movements throughout day.  Go out with friends or go to store  5. Pt able to sleep through the night without waking with c/o pain. Sleep in bed not chair  Met  8/1/17  6. Pt able to perform household cooking/cleaning ADLS safely, confidently, and 4/10 pain or less.  7. Pt to be able to perform self care and grooming ADLs safely, confidently, independently, and4/10 pain or less.   8. Pt will be able to ascend/descend 1 flight of stairs reciprocally with use of unilateral handrail for safety, confidently and 2/10 pain or less.        OUTCOMES:  Initial FOTO:  78 % limited CL  FOTO Goal: 40-60 % limited CK  Midpoint FOTO: 61% limited CL   FOTO Goal: 40-60 % limited CK  FOTO 65% at midpoint, visit 10, with assist needed for balance, sit to stand, fall precautions, mobility        Plan   Continue with established Plan of Care towards established PT goals. Retest and DC to wellness or transition to alternative functional and balance based PT program.

## 2017-09-13 ENCOUNTER — CLINICAL SUPPORT (OUTPATIENT)
Dept: REHABILITATION | Facility: OTHER | Age: 77
End: 2017-09-13
Attending: PHYSICAL MEDICINE & REHABILITATION
Payer: MEDICARE

## 2017-09-13 DIAGNOSIS — M51.36 DDD (DEGENERATIVE DISC DISEASE), LUMBAR: Primary | ICD-10-CM

## 2017-09-13 PROCEDURE — G8980 MOBILITY D/C STATUS: HCPCS | Mod: CL | Performed by: PHYSICAL MEDICINE & REHABILITATION

## 2017-09-13 PROCEDURE — 97110 THERAPEUTIC EXERCISES: CPT | Performed by: PHYSICAL MEDICINE & REHABILITATION

## 2017-09-13 PROCEDURE — 97750 PHYSICAL PERFORMANCE TEST: CPT | Performed by: PHYSICAL MEDICINE & REHABILITATION

## 2017-09-13 PROCEDURE — G8979 MOBILITY GOAL STATUS: HCPCS | Mod: CK | Performed by: PHYSICAL MEDICINE & REHABILITATION

## 2017-09-13 NOTE — PROGRESS NOTES
[facility]        Discharge note      Name: Laura Curry  Clinic Number: [id number]  Date of Treatment: 09/13/2017   Diagnosis:   Encounter Diagnosis   Name Primary?    DDD (degenerative disc disease), lumbar Yes     Physician: Aparna Welch MD    Pain pattern determined: pattern 3 updated to 4 on 5/15/17 with intermittent leg pain  Plan of care signed: 5/1/17   signed 8/9/17    Time in: 8:30 am  Time Out: 9:30 am  Total Treatment time: 60  Precautions: TKR, HTN, dizziness, fall     Visit #: 20    Evaluation: 5/1/17  Assessment due: 6/1/17, done 6/19/17  Next reassessment due: 7/19/17 done 6/26/17  Next reassessment due: 7/26/17, done 8/1/17  Next reassessment due: 9/1/17 done 9/13/17    Subjective   Laura reports she is doing well.   She did have a fall at home, coming out of her bathroom, reaching for her walker.  She did not hurt her back but her right knee a little.  Today she denied any knee and reported no back pain.  Over all she feels her back is better, her balance is her biggest complaint. She has walker, she has bars in bathroom.  Reviewed safety tips with her.   She states that she does have some back pain intermittently, minor soreness and stiffness but it improved over all. She is most concerned with her balance but states it has improved since starting PT.  She states that her HEP is going well.  She is doing them more often.  She plans to attend wellness 2 visits, but has found out the community center near her has free exercises classes daily and she may look into that. Praised her for this opportunity and encouraged her to look into this.      Patient reports their pain to be 0/10 on a 0-10 scale with 0 being no pain and 10 being the worst pain imaginable.    Pain Location: lower back and legs     Work and leisure: retired  Pt goals: decrease pain, go to senior center - she has lift and has community center she can attend, she has gone 1 time, praised  her and encouraged her to continue    Objective       Baseline IM Testing Results:   Date of testin17  ROM 12-30 deg increased to 12-42 with ease visit 3   Max Peak Torque 69 ft lbs    Min Peak Torque 52 ft lbs    Flex/Ext Ratio 1.3/1   % below normative data 52% below         [facility]point  IM Testing Results:   Date of testin17  ROM 6-48   Max Peak Torque 110 ft lbs    Min Peak Torque 41 ft lbs    Flex/Ext Ratio 2.2/1   % below normative data 26% below     Average gain in strength 56% gain        Final   IM Testing Results:   Date of testin17  ROM 0-48   Max Peak Torque 149   Min Peak Torque 44   Flex/Ext Ratio 3/1   % below normative data 13% below     Average gain in strength 103% gain    Functional strength: at midpoint   Sit to stand-no need for hands but using some momentum with rocking  Stand to sit without hands  On and off mat, modified indep  On and off machines, modified indep  On and off bike, min assist for leg  amb without walker in gym with supervision  SLS 2 sec  Stands eyes closed 20 sec no loss of balance    ROM back  Flexion no loss  Ext min loss      FOTO  Intake 22% 78%  2017 38% 62%  2017 41% 59%  2017 38% 62% Current Status CL - At least 60 percent but less than 80 percent    Treatment         Pt was instructed in and performed the following:  Cardiovascular exercise and therapeutic exercise to improve posture, lumbar spine and supporting musculature ROM, strength, and muscular endurance as follows:      Seated hip flexion with single knee to chest 10x B  Seated trunk rotation 10x   Seated lumbar flexion stretches 3 sec hold x10   Seated knee extensions 10x bilaterally   Seated scapular retractions 10x   Standing extension with LE supported 10x     Ambulation around clinic: 2 laps SBA (Mild v/c to maintain upright posture, pt required 2 resting breaks)     Balance work with SBA and UE support: NT  Standing ankle PF 10x NT  Standing hip abduction 10x  NT  Standing knee flexion 10x  NT    [facility] 9/13/2017   Visit Number 20   VAS Pain Rating 0   Treadmill Time (in min.) -   Speed -   Recumbent Bike Seat Pos. -   Time -   Extension in Standing 10   Flexion in Lying -   Flexion in Sitting 10   Lumbar Extension Seat Pad -   Femur Restraint -   Top Dead Center -   Counterweight -   Lumbar Flexion 48   Lumbar Extension 0   Lumbar Peak Torque 149   Min Torque 44   Percent From Norm 13   Percent Change from Initial 103   Lumbar Weight -   Repetitions -   Rating of Perceived Exertion -   Ice - Sitting 10           Peripheral muscle strengthening which included 1 set of 15-20 repetitions at a slow, controlled 7 second per rep pace focused on strengthening supporting musculature for improved body mechanics and functional mobility.  Pt and therapist focused on proper form during treatment to ensure optimal strengthening of each targeted muscle group.  Machines were utilized including  leg extension, leg curl, chest press, rowing and triceps.  Added biceps with free weights 20 reps 1#'s    Written Home Exercises Provided:   Handouts were given to the patient. Pt demo fair understanding of the education provided. Laura demonstrated fair return demonstration of activities.   Single knee to chest on sofa  Double knee to chest on sofa  Lower trunk rotation  Seated lumbar flexion stretches 3 sec hold x10, 3x daily  Seated scapular retractions 10x, 3x daily   Standing ankle PF 10x  Standing hip abduction 10x  Standing knee flexion 10x    Standing extension with LE supported against mat 10x (trial added)   Standing ankle PF 10x  Standing hip abduction 10x  Standing knee flexion 10x      Lumbar roll use compliance: not yet  Additional exercises taught this treatment session:  .   Assessment     Patient attended with no back pain.  Pt ambulated around clinic for 2 laps with SBA and required 2 resting breaks per increased leg fatigue.  Continued seated therex, balance  work, functional training work. Progressed balance work with standing balance progression and she tolerated tx well. Pt is improving in ability to perform with less assistance and v/c.  Pt demonstrates slow transitions between peripheral exercise machines but is able to complete full circuit with improved ease and 1:1 SBA per fall risk. Patient tolerated all machines well. Patient is planning on continuing with HEP following DC but has expressed interest in wellness.  She will attend 2 visits and then may continue at community center.    Pt has improved lumbar and peripheral strength, reduced low back pain, and improved independence with transiitonal movements. She continues to demonstrate balance deficits, poor ambulation endurance, and difficulty descending/ascending stairs. Pt may benefit from referral to functional and balance based PT program to continue to address balance and mobility deficits. Patient interested in this after break, and feels at this time she would rather pursue community center.     Patient has attended 20 visits of the [facility] program for aerobic work, med ex isometric testing with dynamic strengthening on med ex equipment for spine, whole body strengthening on med ex equipment, HEP, education.  Patient has completed [facility] and is ready to be transitioned into wellness program.  Importance of wellness program, and attending 2/month to maintain strength stressed.  Importance of continuing there ex and body mech and ergonomics stressed.    Patient expressed understanding information given.  Patient plans to attend wellness 2 visits and then may transition to community center. She is ready to transition to wellness.        Patient retested at visit   20 and shows improvement in:  Improved posture,    Improved lumbar/   ROM,  initially on med ex test 12-30 and   Currently  0-48  Improved strength at each test point on lumbar med ex IM test with 103%   average improvement noted  with Reduced pain  Noted by patient        Short term goals: 5 weeks or 10 visits  1.  Pt will demonstrate increased lumbar ROM as measured by med ex by at least 3 degrees from the initial ROM value with improvements noted in functional ROM and ability to perform ADL    MET WITH IMPROVED ABILITY TO DRESS 6/26/17  2.  Pt will demonstrate increased maximum isometric torque value by 5% when compared to the initial value resulting in improved ability to function, stand, walk, lift items.   MET WITH IMPROVED ABILITY TO CARRY LAUNDRY 6/26/17  3.  Pt will tolerate regular 5% increases in dynamic weight loads on all machines   MET  4.  Patient report a reduction in worst pain score by 1-2 points for improved tolerance during work and recreational activities   MET  5.  Pt able to perform HEP correctly with minimal cueing or supervision for therapist  ONGOING  6. Pt will demonstrate improvement in flexion/extension strength ratio compared in initial value   MET    Long term goals: 10 weeks or 20 visits  1. Pt will demonstrate increased lumbar ROM by at least 6 degrees from initial ROM value, resulting in improved ability to perform functional fwd bending while standing and sitting.  MET 9/13/17  2. Pt will demonstrate increased maximum isometric torque value by 10% when compared to the initial value, resulting in improved ability to perform bending, lifting, and carrying activities safely, confidently, and 4/10 pain or less.   MET 9/13/17  3. Pt will be able to ambulate community distances safely, confidently, and 4/10 pain or less.  Met 8/1/17 Return to community [place]-encouraged -she has gone 1 time  4. Pt to demonstrate ability to independently control and reduce their pain through posture positioning and mechanical movements throughout day.  Go out with friends or go to store  MET BUT NEEDS CUING TO CONTINUE  5. Pt able to sleep through the night without waking with c/o pain. Sleep in bed not chair  Met 8/1/17  6. Pt  able to perform household cooking/cleaning ADLS safely, confidently, and 4/10 pain or less.  PROGRESS MADE 9/13/17  7. Pt to be able to perform self care and grooming ADLs safely, confidently, independently, and4/10 pain or less.   MET  8. Pt will be able to ascend/descend 1 flight of stairs reciprocally with use of unilateral handrail for safety, confidently and 2/10 pain or less.      MET WITH RAIL      OUTCOMES:  FOTO 78% to 62%, same category but improved      Plan   DC to wellness.  Patient expressed interest in community center, but not interested in PT at this time for balance.

## 2019-06-03 NOTE — PROGRESS NOTES
[facility]      Name: Laura Curry  Clinic Number: [id number]  Date of Treatment: 2017   Diagnosis:   Encounter Diagnosis   Name Primary?    DDD (degenerative disc disease), lumbar Yes     Physician: Aparna Welch MD    Pain pattern determined: pattern 3 updated to 4 on 5/15/17 with intermittent leg pain  Plan of care signed: 17  POC due 17    Time in: 8:00  Time Out: 9:00  Total Treatment time: 50  Precautions: TKR, HTN, dizziness, fall     Visit #: 7    Evaluation: 17  Assessment due: 17  Face to Face discussion of patient was done between PT and PTA.       Subjective   Laura reports she fell Friday in her house and with her yelling her neighbor can to help her up off the floor. Pt c/o no LBP, just R side soreness from her fall. She stated she did not feel she needed to go to the doctor or the ER.  Discussed safety at home to decrease fall and also recommended to have family or some type of aid to come to the house and help with cleaning and supervision of her for safety. She is thinking about it.     Patient reports their pain to be 0/10 on a 0-10 scale with 0 being no pain and 10 being the worst pain imaginable.  Pain Location: lower back and legs     Work and leisure: retired  Pt goals: decrease pain, go to [place]    Objective       Baseline IM Testing Results:   Date of testin17  ROM 12-30 deg increased to 12-42 with ease visit 3   Max Peak Torque 69 ft lbs    Min Peak Torque 52 ft lbs    Flex/Ext Ratio 1.3/1   % below normative data 52% below       amb with rolator walker to therapy and in gym  Dizziness when she first got up from mat which passed with sitting  Bike tolerated without dizziness, but difficult on knees, reassess on treadmill possibly next visit      Treatment         Pt was instructed in and performed the following:  Cardiovascular exercise and therapeutic exercise to improve posture, lumbar spine and supporting  musculature ROM, strength, and muscular endurance as follows:  [facility] 6/12/2017   Visit Number 7   VAS Pain Rating 0   Treadmill Time (in min.) -   Speed -   Recumbent Bike Seat Pos. -bike not avaible   Time -   Flexion in Lying -   Flexion in Sitting 10   Lumbar Weight 46   Repetitions 20   Rating of Perceived Exertion 3   Ice - Sitting No ice     Supine single knee to chest (NT)  Supine lower trunk rotation(NT)  Seated lumbar flexion stretches 3 sec hold x10  Seated scapular retractions 10x    Peripheral muscle strengthening which included 1 set of 15-20 repetitions at a slow, controlled 7 second per rep pace focused on strengthening supporting musculature for improved body mechanics and functional mobility.  Pt and therapist focused on proper form during treatment to ensure optimal strengthening of each targeted muscle group.  Machines were utilized including  leg extension, leg curl, chest press, rowing       Written Home Exercises Provided:   Handouts were given to the patient. Pt demo fair understanding of the education provided. Laura demonstrated fair return demonstration of activities.   Single knee to chest on sofa  Double knee to chest on sofa  Lower trunk rotation  Lumbar roll use compliance: not yet  Additional exercises taught this treatment session:   Seated lumbar flexion stretches 3 sec hold x10, 3x daily  Seated scapular retractions 10x, 3x daily     Assessment     Pt performed seated therex LAQ, AP's and marching x 15 reps. Also sit<>stand x5 trials with light use of B UE to increase balance.No LBP with session. No supine stretch today due to gets her dizzy. She performed seated trunk flexion due to creates no dizziness.   Pt did well on Lumbar Med X but needed multiple verbal cues for timing, pace and speed. Pt requires mod-max supervision for safety when transitioning between machines. No LOB during and post session.  Pt will continue to benefit from skilled outpatient physical  therapy to address the deficits stated in the impairment chart, provide pt/family education and to maximize pt's level of independence in the home and community environment. Pt's spiritual, cultural and educational needs considered and pt agreeable to plan of care and goals as stated below:     Medical necessity is demonstrated by the following IMPAIRMENTS/PROBLEMS: decreased ROM, weakness LE, lower back pain      GOALS: Pt is in agreement with the following goals.      Short term goals: 5 weeks or 10 visits  1.  Pt will demonstrate increased lumbar ROM as measured by med ex by at least 3 degrees from the initial ROM value with improvements noted in functional ROM and ability to perform ADL  2.  Pt will demonstrate increased maximum isometric torque value by 5% when compared to the initial value resulting in improved ability to function, stand, walk, lift items.  3.  Pt will tolerate regular 5% increases in dynamic weight loads on all machines  4.  Patient report a reduction in worst pain score by 1-2 points for improved tolerance during work and recreational activities  5.  Pt able to perform HEP correctly with minimal cueing or supervision for therapist  6. Pt will demonstrate improvement in flexion/extension strength ratio compared in initial value    Long term goals: 10 weeks or 20 visits  1. Pt will demonstrate increased lumbar ROM by at least 6 degrees from initial ROM value, resulting in improved ability to perform functional fwd bending while standing and sitting.    2. Pt will demonstrate increased maximum isometric torque value by 10% when compared to the initial value, resulting in improved ability to perform bending, lifting, and carrying activities safely, confidently, and 4/10 pain or less.   3. Pt will be able to ambulate community distances safely, confidently, and 4/10 pain or less.  Return to community center  4. Pt to demonstrate ability to independently control and reduce their pain through  posture positioning and mechanical movements throughout day.  Go out with friends or go to store  5. Pt able to sleep through the night without waking with c/o pain. Sleep in bed not chair  6. Pt able to perform household cooking/cleaning ADLS safely, confidently, and 4/10 pain or less.  7. Pt to be able to perform self care and grooming ADLs safely, confidently, independently, and4/10 pain or less.   8. Pt will be able to ascend/descend 1 flight of stairs reciprocally with use of unilateral handrail for safety, confidently and 2/10 pain or less.        OUTCOMES:  Initial FOTO:  78 % limited CL  FOTO Goal: 40-60 % limited CK  [facility]point FOTO: 61% limited CL   FOTO Goal: 40-60 % limited CK  Plan   Continue with established Plan of Care towards established PT goals.  Pt may benefit from learning more seated exercises to improve compliance and confidence.    LSO when OOB at all times/fall precautions/spinal precautions